# Patient Record
Sex: FEMALE | Race: WHITE | Employment: UNEMPLOYED | URBAN - METROPOLITAN AREA
[De-identification: names, ages, dates, MRNs, and addresses within clinical notes are randomized per-mention and may not be internally consistent; named-entity substitution may affect disease eponyms.]

---

## 2018-03-16 ENCOUNTER — HOSPITAL ENCOUNTER (OUTPATIENT)
Age: 33
Discharge: HOME | End: 2018-03-16
Attending: OBSTETRICS & GYNECOLOGY | Admitting: OBSTETRICS & GYNECOLOGY
Payer: COMMERCIAL

## 2018-03-16 VITALS
OXYGEN SATURATION: 96 % | SYSTOLIC BLOOD PRESSURE: 96 MMHG | DIASTOLIC BLOOD PRESSURE: 58 MMHG | TEMPERATURE: 99 F | HEART RATE: 82 BPM | RESPIRATION RATE: 18 BRPM

## 2018-03-16 LAB
BASOPHILS # BLD: 0 K/UL (ref 0–0.2)
BASOPHILS NFR BLD: 0 % (ref 0–2)
DIFFERENTIAL METHOD BLD: ABNORMAL
EOSINOPHIL # BLD: 0.1 K/UL (ref 0–0.8)
EOSINOPHIL NFR BLD: 2 % (ref 0.5–7.8)
ERYTHROCYTE [DISTWIDTH] IN BLOOD BY AUTOMATED COUNT: 13.1 % (ref 11.9–14.6)
ERYTHROCYTE [DISTWIDTH] IN BLOOD BY AUTOMATED COUNT: 13.2 % (ref 11.9–14.6)
HCT VFR BLD AUTO: 30.8 % (ref 35.8–46.3)
HCT VFR BLD AUTO: 31.3 % (ref 35.8–46.3)
HCT VFR BLD AUTO: 34.1 % (ref 35.8–46.3)
HGB BLD-MCNC: 10.5 G/DL (ref 11.7–15.4)
HGB BLD-MCNC: 10.6 G/DL (ref 11.7–15.4)
HGB BLD-MCNC: 11.5 G/DL (ref 11.7–15.4)
IMM GRANULOCYTES # BLD: 0 K/UL (ref 0–0.5)
IMM GRANULOCYTES NFR BLD AUTO: 0 % (ref 0–5)
LYMPHOCYTES # BLD: 1.9 K/UL (ref 0.5–4.6)
LYMPHOCYTES NFR BLD: 27 % (ref 13–44)
MCH RBC QN AUTO: 28.5 PG (ref 26.1–32.9)
MCH RBC QN AUTO: 28.6 PG (ref 26.1–32.9)
MCHC RBC AUTO-ENTMCNC: 33.7 G/DL (ref 31.4–35)
MCHC RBC AUTO-ENTMCNC: 33.9 G/DL (ref 31.4–35)
MCV RBC AUTO: 84.4 FL (ref 79.6–97.8)
MCV RBC AUTO: 84.6 FL (ref 79.6–97.8)
MONOCYTES # BLD: 0.7 K/UL (ref 0.1–1.3)
MONOCYTES NFR BLD: 9 % (ref 4–12)
NEUTS SEG # BLD: 4.5 K/UL (ref 1.7–8.2)
NEUTS SEG NFR BLD: 62 % (ref 43–78)
PLATELET # BLD AUTO: 172 K/UL (ref 150–450)
PLATELET # BLD AUTO: 200 K/UL (ref 150–450)
PMV BLD AUTO: 8.5 FL (ref 10.8–14.1)
PMV BLD AUTO: 8.7 FL (ref 10.8–14.1)
RBC # BLD AUTO: 3.7 M/UL (ref 4.05–5.25)
RBC # BLD AUTO: 4.04 M/UL (ref 4.05–5.25)
WBC # BLD AUTO: 10 K/UL (ref 4.3–11.1)
WBC # BLD AUTO: 7.3 K/UL (ref 4.3–11.1)

## 2018-03-16 PROCEDURE — 65270000029 HC RM PRIVATE

## 2018-03-16 PROCEDURE — 85025 COMPLETE CBC W/AUTO DIFF WBC: CPT | Performed by: OBSTETRICS & GYNECOLOGY

## 2018-03-16 PROCEDURE — 85027 COMPLETE CBC AUTOMATED: CPT | Performed by: OBSTETRICS & GYNECOLOGY

## 2018-03-16 PROCEDURE — 36415 COLL VENOUS BLD VENIPUNCTURE: CPT | Performed by: OBSTETRICS & GYNECOLOGY

## 2018-03-16 PROCEDURE — 74011258636 HC RX REV CODE- 258/636: Performed by: OBSTETRICS & GYNECOLOGY

## 2018-03-16 PROCEDURE — 85018 HEMOGLOBIN: CPT | Performed by: OBSTETRICS & GYNECOLOGY

## 2018-03-16 RX ORDER — HYDROMORPHONE HCL IN 0.9% NACL 50 MG/50ML
1 PLASTIC BAG, INJECTION (ML) INJECTION
Status: DISCONTINUED | OUTPATIENT
Start: 2018-03-16 | End: 2018-03-16 | Stop reason: HOSPADM

## 2018-03-16 RX ORDER — ONDANSETRON 2 MG/ML
4 INJECTION INTRAMUSCULAR; INTRAVENOUS
Status: DISCONTINUED | OUTPATIENT
Start: 2018-03-16 | End: 2018-03-16 | Stop reason: HOSPADM

## 2018-03-16 RX ORDER — DEXTROSE, SODIUM CHLORIDE, SODIUM LACTATE, POTASSIUM CHLORIDE, AND CALCIUM CHLORIDE 5; .6; .31; .03; .02 G/100ML; G/100ML; G/100ML; G/100ML; G/100ML
150 INJECTION, SOLUTION INTRAVENOUS CONTINUOUS
Status: DISCONTINUED | OUTPATIENT
Start: 2018-03-16 | End: 2018-03-16

## 2018-03-16 RX ORDER — PROGESTERONE 200 MG/1
200 CAPSULE ORAL
COMMUNITY
End: 2018-09-25

## 2018-03-16 RX ORDER — METFORMIN HYDROCHLORIDE 500 MG/1
500 TABLET ORAL
COMMUNITY
End: 2018-09-25

## 2018-03-16 RX ADMIN — SODIUM CHLORIDE, SODIUM LACTATE, POTASSIUM CHLORIDE, CALCIUM CHLORIDE, AND DEXTROSE MONOHYDRATE 150 ML/HR: 600; 310; 30; 20; 5 INJECTION, SOLUTION INTRAVENOUS at 07:05

## 2018-03-16 NOTE — IP AVS SNAPSHOT
[address] 
[phone] Patient: Marly Lofton MRN: [id number] IZL:[id number] A check radha indicates which time of day the medication should be taken. My Medications ASK your doctor about these medications Instructions Each Dose to Equal  
 Morning Noon Evening Bedtime  
 metFORMIN 500 mg tablet Commonly known as:  GLUCOPHAGE Your last dose was: Your next dose is: Take 500 mg by mouth daily (with breakfast). 500 mg  
    
   
   
   
  
 progesterone 200 mg capsule Commonly known as:  PROMETRIUM Your last dose was: Your next dose is: Take 200 mg by mouth nightly. 2 capsules  200 mg

## 2018-03-16 NOTE — PROGRESS NOTES
Shift assessment complete; pt in bed resting. Alert & oriented. Resp even & unlabored on room air; lungs clear. HR regular. Abdomen soft & tender with active bowel sounds. Pt sates she is having 6/10 abdominal pain. No requests for pain meds at this time. Call light in reach; bed low & locked; will continue to monitor.

## 2018-03-16 NOTE — PROGRESS NOTES
Discharge instructions given to pt. Verbalized understanding. IV removed. Top intact. Opportunity for questions provided. Instructed pt to call when she was ready to be taken down.

## 2018-03-16 NOTE — PROGRESS NOTES
Admit assessment complete, see flow sheet notes. Pt resting in bed and is alert and oriented x 4. She denies pain and is on room air. RR even and unlabored. Patient oriented to call light, room, and staff and instructed to call for assistance if needed. Will monitor. Dual skin assessment completed with Renate Lee RN. No skin breakdown noted.

## 2018-03-16 NOTE — PROGRESS NOTES
TRANSFER - IN REPORT:    Verbal report received from Carolina Hearn on Kanika Head  being received from [facility] ER for routine progression of care      Report consisted of patients Situation, Background, Assessment and   Recommendations(SBAR). Information from the following report(s) SBAR, Kardex and ED Summary was reviewed with the receiving nurse. Opportunity for questions and clarification was provided. Assessment completed upon patients arrival to unit and care assumed.

## 2018-03-16 NOTE — IP AVS SNAPSHOT
Summary of Care Report The Summary of Care report has been created to help improve care coordination. Users with access to [employer] or [address] (Web-based application) may access additional patient information including the Discharge Summary. If you are not currently a [address] user and need more information, please call the number listed below in the [place] [address] section and ask to be connected with Medical Records. Facility Information Name Address Phone [address] [place] [address] [address] [place] [id number] [phone] Patient Information Patient Name Sex  Patricia Wolfe ([id number]) Female 1985 Discharge Information Admitting Provider Service Area Unit Juventino Del Angel MD / [id number] [address] Med Surg / [phone] Discharge Provider Discharge Date/Time Discharge Disposition Destination (none) 3/16/2018 (Pending) AHR (none) You are allergic to the following Allergen Reactions Mercury (Elemental) Rash Current Discharge Medication List  
  
ASK your doctor about these medications Dose & Instructions Dispensing Information Comments  
 metFORMIN 500 mg tablet Commonly known as:  GLUCOPHAGE Dose:  500 mg Take 500 mg by mouth daily (with breakfast). Refills:  0  
   
 progesterone 200 mg capsule Commonly known as:  PROMETRIUM Dose:  200 mg Take 200 mg by mouth nightly. 2 capsules Refills:  0 Follow-up Information Follow up With Details Comments Contact Info Provider Unknown   Patient not available to ask Discharge Instructions DISCHARGE SUMMARY from Nurse PATIENT INSTRUCTIONS: 
 
After general anesthesia or intravenous sedation, for 24 hours or while taking prescription Narcotics: · Limit your activities · Do not drive and operate hazardous machinery · Do not make important personal or business decisions · Do  not drink alcoholic beverages · If you have not urinated within 8 hours after discharge, please contact your surgeon on call. Report the following to your surgeon: 
· Excessive pain, swelling, redness or odor of or around the surgical area · Temperature over 100.5 · Nausea and vomiting lasting longer than 4 hours or if unable to take medications · Any signs of decreased circulation or nerve impairment to extremity: change in color, persistent  numbness, tingling, coldness or increase pain · Any questions What to do at Home: 
Recommended activity: Activity as tolerated. If you experience any of the following symptoms nausea, vomiting, chest pain, shortness of breath;  please follow up with MD . 
 
*  Please give a list of your current medications to your Primary Care Provider. *  Please update this list whenever your medications are discontinued, doses are 
    changed, or new medications (including over-the-counter products) are added. *  Please carry medication information at all times in case of emergency situations. These are general instructions for a healthy lifestyle: No smoking/ No tobacco products/ Avoid exposure to second hand smoke Surgeon General's Warning:  Quitting smoking now greatly reduces serious risk to your health. Obesity, smoking, and sedentary lifestyle greatly increases your risk for illness A healthy diet, regular physical exercise & weight monitoring are important for maintaining a healthy lifestyle You may be retaining fluid if you have a history of heart failure or if you experience any of the following symptoms:  Weight gain of 3 pounds or more overnight or 5 pounds in a week, increased swelling in our hands or feet or shortness of breath while lying flat in bed.   Please call your doctor as soon as you notice any of these symptoms; do not wait until your next office visit. Recognize signs and symptoms of STROKE: 
 
F-face looks uneven A-arms unable to move or move unevenly S-speech slurred or non-existent T-time-call 911 as soon as signs and symptoms begin-DO NOT go Back to bed or wait to see if you get better-TIME IS BRAIN. Warning Signs of HEART ATTACK Call 911 if you have these symptoms: 
? Chest discomfort. Most heart attacks involve discomfort in the center of the chest that lasts more than a few minutes, or that goes away and comes back. It can feel like uncomfortable pressure, squeezing, fullness, or pain. ? Discomfort in other areas of the upper body. Symptoms can include pain or discomfort in one or both arms, the back, neck, jaw, or stomach. ? Shortness of breath with or without chest discomfort. ? Other signs may include breaking out in a cold sweat, nausea, or lightheadedness. Don't wait more than five minutes to call [address]! Fast action can save your life. Calling 911 is almost always the fastest way to get lifesaving treatment. Emergency Medical Services staff can begin treatment when they arrive  up to an hour sooner than if someone gets to the hospital by car. The discharge information has been reviewed with the patient. The patient verbalized understanding. Discharge medications reviewed with the patient and appropriate educational materials and side effects teaching were provided. ___________________________________________________________________________________________________________________________________ Chart Review Routing History No Routing History on File

## 2018-03-16 NOTE — DISCHARGE INSTRUCTIONS
DISCHARGE SUMMARY from Nurse    PATIENT INSTRUCTIONS:    After general anesthesia or intravenous sedation, for 24 hours or while taking prescription Narcotics:  · Limit your activities  · Do not drive and operate hazardous machinery  · Do not make important personal or business decisions  · Do  not drink alcoholic beverages  · If you have not urinated within 8 hours after discharge, please contact your surgeon on call. Report the following to your surgeon:  · Excessive pain, swelling, redness or odor of or around the surgical area  · Temperature over 100.5  · Nausea and vomiting lasting longer than 4 hours or if unable to take medications  · Any signs of decreased circulation or nerve impairment to extremity: change in color, persistent  numbness, tingling, coldness or increase pain  · Any questions    What to do at Home:  Recommended activity: Activity as tolerated. If you experience any of the following symptoms nausea, vomiting, chest pain, shortness of breath;  please follow up with MD .    *  Please give a list of your current medications to your Primary Care Provider. *  Please update this list whenever your medications are discontinued, doses are      changed, or new medications (including over-the-counter products) are added. *  Please carry medication information at all times in case of emergency situations. These are general instructions for a healthy lifestyle:    No smoking/ No tobacco products/ Avoid exposure to second hand smoke  Surgeon General's Warning:  Quitting smoking now greatly reduces serious risk to your health.     Obesity, smoking, and sedentary lifestyle greatly increases your risk for illness    A healthy diet, regular physical exercise & weight monitoring are important for maintaining a healthy lifestyle    You may be retaining fluid if you have a history of heart failure or if you experience any of the following symptoms:  Weight gain of 3 pounds or more overnight or 5 pounds in a week, increased swelling in our hands or feet or shortness of breath while lying flat in bed. Please call your doctor as soon as you notice any of these symptoms; do not wait until your next office visit. Recognize signs and symptoms of STROKE:    F-face looks uneven    A-arms unable to move or move unevenly    S-speech slurred or non-existent    T-time-call 911 as soon as signs and symptoms begin-DO NOT go       Back to bed or wait to see if you get better-TIME IS BRAIN. Warning Signs of HEART ATTACK     Call 911 if you have these symptoms:   Chest discomfort. Most heart attacks involve discomfort in the center of the chest that lasts more than a few minutes, or that goes away and comes back. It can feel like uncomfortable pressure, squeezing, fullness, or pain.  Discomfort in other areas of the upper body. Symptoms can include pain or discomfort in one or both arms, the back, neck, jaw, or stomach.  Shortness of breath with or without chest discomfort.  Other signs may include breaking out in a cold sweat, nausea, or lightheadedness. Don't wait more than five minutes to call 911 - MINUTES MATTER! Fast action can save your life. Calling 911 is almost always the fastest way to get lifesaving treatment. Emergency Medical Services staff can begin treatment when they arrive -- up to an hour sooner than if someone gets to the hospital by car. The discharge information has been reviewed with the patient. The patient verbalized understanding. Discharge medications reviewed with the patient and appropriate educational materials and side effects teaching were provided.   ___________________________________________________________________________________________________________________________________

## 2018-03-16 NOTE — PROGRESS NOTES
Dr. Nichelle Teran paged about patient arriving to floor. Orders received and MD to come see patient.

## 2018-03-16 NOTE — IP AVS SNAPSHOT
Des Garcia 
 
 
 [address] [id number] [facility] 
[phone] Patient: Latasha Stokes MRN: [id number] FNA:[id number] About your hospitalization You were admitted on:  March 16, 2018 You last received care in the:  [address] [place] [address] You were discharged on:  March 16, 2018 Why you were hospitalized Your primary diagnosis was:  Not on File Follow-up Information Follow up With Details Comments Contact Info Provider Unknown   Patient not available to ask Discharge Orders None A check radha indicates which time of day the medication should be taken. My Medications ASK your doctor about these medications Instructions Each Dose to Equal  
 Morning Noon Evening Bedtime  
 metFORMIN 500 mg tablet Commonly known as:  GLUCOPHAGE Your last dose was: Your next dose is: Take 500 mg by mouth daily (with breakfast). 500 mg  
    
   
   
   
  
 progesterone 200 mg capsule Commonly known as:  PROMETRIUM Your last dose was: Your next dose is: Take 200 mg by mouth nightly. 2 capsules 200 mg Discharge Instructions DISCHARGE SUMMARY from Nurse PATIENT INSTRUCTIONS: 
 
After general anesthesia or intravenous sedation, for 24 hours or while taking prescription Narcotics: · Limit your activities · Do not drive and operate hazardous machinery · Do not make important personal or business decisions · Do  not drink alcoholic beverages · If you have not urinated within 8 hours after discharge, please contact your surgeon on call. Report the following to your surgeon: 
· Excessive pain, swelling, redness or odor of or around the surgical area · Temperature over 100.5 · Nausea and vomiting lasting longer than 4 hours or if unable to take medications · Any signs of decreased circulation or nerve impairment to extremity: change in color, persistent  numbness, tingling, coldness or increase pain · Any questions What to do at Home: 
Recommended activity: Activity as tolerated. If you experience any of the following symptoms nausea, vomiting, chest pain, shortness of breath;  please follow up with MD . 
 
*  Please give a list of your current medications to your Primary Care Provider. *  Please update this list whenever your medications are discontinued, doses are 
    changed, or new medications (including over-the-counter products) are added. *  Please carry medication information at all times in case of emergency situations. These are general instructions for a healthy lifestyle: No smoking/ No tobacco products/ Avoid exposure to second hand smoke Surgeon General's Warning:  Quitting smoking now greatly reduces serious risk to your health. Obesity, smoking, and sedentary lifestyle greatly increases your risk for illness A healthy diet, regular physical exercise & weight monitoring are important for maintaining a healthy lifestyle You may be retaining fluid if you have a history of heart failure or if you experience any of the following symptoms:  Weight gain of 3 pounds or more overnight or 5 pounds in a week, increased swelling in our hands or feet or shortness of breath while lying flat in bed. Please call your doctor as soon as you notice any of these symptoms; do not wait until your next office visit. Recognize signs and symptoms of STROKE: 
 
F-face looks uneven A-arms unable to move or move unevenly S-speech slurred or non-existent T-time-call 911 as soon as signs and symptoms begin-DO NOT go Back to bed or wait to see if you get better-TIME IS BRAIN. Warning Signs of HEART ATTACK Call 911 if you have these symptoms: 
? Chest discomfort.  Most heart attacks involve discomfort in the center of the chest that lasts more than a few minutes, or that goes away and comes back. It can feel like uncomfortable pressure, squeezing, fullness, or pain. ? Discomfort in other areas of the upper body. Symptoms can include pain or discomfort in one or both arms, the back, neck, jaw, or stomach. ? Shortness of breath with or without chest discomfort. ? Other signs may include breaking out in a cold sweat, nausea, or lightheadedness. Don't wait more than five minutes to call [address]! Fast action can save your life. Calling 911 is almost always the fastest way to get lifesaving treatment. Emergency Medical Services staff can begin treatment when they arrive  up to an hour sooner than if someone gets to the hospital by car. The discharge information has been reviewed with the patient. The patient verbalized understanding. Discharge medications reviewed with the patient and appropriate educational materials and side effects teaching were provided. ___________________________________________________________________________________________________________________________________ Introducing [facility] & HEALTH SERVICES! [facility] introduces WebVisible patient portal. Now you can access parts of your medical record, email your doctor's office, and request medication refills online. 1. In your internet browser, go to [email or web address] 2. Click on the First Time User? Click Here link in the Sign In box. You will see the New Member Sign Up page. 3. Enter your WebVisible Access Code exactly as it appears below. You will not need to use this code after youve completed the sign-up process. If you do not sign up before the expiration date, you must request a new code. · WebVisible Access Code: [email or web address] Expires: 6/14/2018  6:05 AM 
 
4. Enter the last four digits of your Social Security Number (xxxx) and Date of Birth (mm/dd/yyyy) as indicated and click Submit. You will be taken to the next sign-up page. 5. Create a eRALOS3 ID. This will be your eRALOS3 login ID and cannot be changed, so think of one that is secure and easy to remember. 6. Create a eRALOS3 password. You can change your password at any time. 7. Enter your Password Reset Question and Answer. This can be used at a later time if you forget your password. 8. Enter your e-mail address. You will receive e-mail notification when new information is available in [address] 9. Click Sign Up. You can now view and download portions of your medical record. 10. Click the Download Summary menu link to download a portable copy of your medical information. If you have questions, please visit the Frequently Asked Questions section of the eRALOS3 website. Remember, eRALOS3 is NOT to be used for urgent needs. For medical emergencies, dial 911. Now available from your iPhone and Android! Providers Seen During Your Hospitalization Provider Specialty Primary office phone Linda Camarena MD Obstetrics & Gynecology [phone] Your Primary Care Physician (PCP) Primary Care Physician Office Phone Office Fax UNKNOWN, PROVIDER ** None ** ** None ** You are allergic to the following Allergen Reactions Mercury (Elemental) Rash Patient Belongings The following personal items are in your possession at time of discharge: 
  Dental Appliances: None         Home Medications: None   Jewelry: Ring, Necklace, With patient  Clothing: None    Other Valuables: None Discharge Instructions Attachments/References OVARIAN CYST: RUPTURED (ENGLISH) HAND-WASHING (ENGLISH) SECONDHAND SMOKE (ENGLISH) Patient Handouts Ruptured Ovarian Cyst: Care Instructions Your Care Instructions Sometimes a sac forms on the surface of a woman's ovary.  When the sac swells up with fluid, it forms a cyst. If the cyst breaks open, it is called a ruptured ovarian cyst. Sometimes a cyst may rupture and then form again. Sometimes a cyst may partly break open. Blood and fluid can spill out into the lower belly and pelvis. You may not have symptoms from the cyst. But if it is large, or if it twists or bleeds, you may have pain or other problems. You may feel pain because the fluid irritates the pelvis. Your doctor may use a pelvic ultrasound to see if you have a cyst. Your doctor may also do blood tests. Treatment depends on your symptoms. If they are mild, your doctor may suggest carefully watching your symptoms. But if you have a cyst that is very large, bleeds a lot, or causes other problems, your doctor may suggest surgery to remove it. Follow-up care is a key part of your treatment and safety. Be sure to make and go to all appointments, and call your doctor if you are having problems. It's also a good idea to know your test results and keep a list of the medicines you take. How can you care for yourself at home? · Use heat, such as a warm water bottle, a heating pad set on low, or a warm bath, to relax tense muscles and relieve cramping. · Be safe with medicines. Read and follow all instructions on the label. ¨ If the doctor gave you a prescription medicine for pain, take it as prescribed. ¨ If you are not taking a prescription pain medicine, ask your doctor if you can take an over-the-counter medicine. When should you call for help? Call 911 anytime you think you may need emergency care. For example, call if: 
? · You passed out (lost consciousness). ?Call your doctor now or seek immediate medical care if: 
? · You have severe vaginal bleeding. ? · You are dizzy or lightheaded, or you feel like you may faint. ? · You have new or worse pain in your belly or pelvis. ? Watch closely for changes in your health, and be sure to contact your doctor if: 
? · You think you may be pregnant. ? · You do not get better as expected. Where can you learn more? Go to http://trey-radha.info/. Enter B223 in the search box to learn more about \"Ruptured Ovarian Cyst: Care Instructions. \" Current as of: October 13, 2016 Content Version: 11.4 © 4781-9660 [employer]. Care instructions adapted under license by [employer] (which disclaims liability or warranty for this information). If you have questions about a medical condition or this instruction, always ask your healthcare professional. [address] any warranty or liability for your use of this information. Hand-Washing: Care Instructions Your Care Instructions It is important for caregivers to wash their hands properly. This is the single best way to prevent the spread of infections. Hand-washing can help keep you from getting sick. It is easy, doesn't cost much, and it works. Make sure that you and your caregivers follow safe hand-washing routines. Caregivers may include health care workers or family members at home or in a care facility. You can talk to them about this information on hand-washing. Follow-up care is a key part of your treatment and safety. Be sure to make and go to all appointments, and call your doctor if you are having problems. It's also a good idea to know your test results and keep a list of the medicines you take. How can you care for yourself at home? · Caregivers should wash their hands with soap and water: ¨ When their hands are dirty, especially after being exposed to body fluids. This includes blood. ¨ When their hands may have been exposed to germs that could spread infection. ¨ After they touch broken skin, sores, or wound bandages. ¨ After they use the bathroom. · At other times, caregivers can use an alcohol-based gel  or soap and water to clean hands. This should be done: ¨ Before and after any contact with you. ¨ After they take off gloves. ¨ Before they handle a device that touches your body (even if gloves are used). ¨ After they touch any objects near you, such as medical equipment, lights, or doorknobs. ¨ Before they handle medicine or prepare food. Proper hand-washing for caregivers · When using an alcohol-based gel , fill your palm with the gel. Then spread it all over your hands. Rub your hands together until they are dry. · When washing hands with soap and water: ¨ Wet your hands with running water, and apply soap. ¨ Rub your hands together to make a lather. Scrub well for at least 20 seconds. ¨ Pay special attention to your wrists, the backs of your hands, between your fingers, and under your fingernails. ¨ Rinse your hands well under running water. ¨ Use a clean towel to dry your hands, or air-dry your hands. You may want to use a clean towel as a barrier between the faucet and your clean hands when you turn off the water. · If you use bar soap, use small bars. Set the soap on a rack that lets water drain. Where can you learn more? Go to http://trey-radha.info/. Enter X083 in the search box to learn more about \"Hand-Washing: Care Instructions. \" Current as of: March 3, 2017 Content Version: 11.4 © 0797-0573 [employer]. Care instructions adapted under license by [employer] (which disclaims liability or warranty for this information). If you have questions about a medical condition or this instruction, always ask your healthcare professional. [address] [address] any warranty or liability for your use of this information. Secondhand Smoke: Care Instructions Your Care Instructions Secondhand smoke comes from the burning end of a cigarette, cigar, or pipe and the smoke that a smoker exhales. The smoke contains nicotine and many other harmful chemicals.  Breathing secondhand smoke can cause or worsen health problems including cancer, asthma, coronary artery disease, and respiratory infections. It can make your eyes and nose burn and cause a sore throat. Secondhand smoke is especially bad for babies and young children whose lungs are still developing. Babies whose parents smoke are more likely to have ear infections, pneumonia, and bronchitis in the first few years of their lives. Secondhand smoke can make asthma symptoms worse in children. If you are pregnant, it is important that you not smoke and that you avoid secondhand smoke. You are more likely to give birth to a baby who weighs less than expected (low birth weight) if you smoke. And your baby may have a greater risk for sudden infant death syndrome (SIDS). Babies whose mothers are exposed to secondhand smoke during pregnancy have a higher risk for health problems. Follow-up care is a key part of your treatment and safety. Be sure to make and go to all appointments, and call your doctor if you are having problems. It's also a good idea to know your test results and keep a list of the medicines you take. How can you care for yourself at home? · Do not smoke or let anyone else smoke in your home. If people must smoke, ask them to go outside. · If people do smoke in your home, choose a room where you can open a window or use a fan to get the smoke outside. · Do not let anyone smoke in your car. If someone must smoke, pull over in a safe place and let him or her smoke away from the car. · Ask your employer to make sure that you have a smoke-free work area. · Make sure that your children are not exposed to secondhand smoke at day care, school, and after-school programs. · Try to choose nonsmoking bars, restaurants, and other public places when you go out. · Help your family and friends who smoke to quit by encouraging them to try. Tell them about treatment resources. Having support from others often helps. · If you smoke, quit. Quitting is hard, but there are ways to boost your chance of quitting tobacco for good. ¨ Use nicotine gum, patches, or lozenges. Call a quitline. Ask your doctor about stop-smoking programs and medicines. ¨ Keep trying. When should you call for help? Watch closely for changes in your health, and be sure to contact your doctor if you have any problems. Where can you learn more? Go to http://trey-radha.info/. Enter L004 in the search box to learn more about \"Secondhand Smoke: Care Instructions. \" Current as of: March 20, 2017 Content Version: 11.4 © 8079-9965 [employer]. Care instructions adapted under license by [employer] (which disclaims liability or warranty for this information). If you have questions about a medical condition or this instruction, always ask your healthcare professional. [address] any warranty or liability for your use of this information. Please provide this summary of care documentation to your next provider. Signatures-by signing, you are acknowledging that this After Visit Summary has been reviewed with you and you have received a copy. Patient Signature:  ____________________________________________________________ Date:  ____________________________________________________________  
  
Theresa Le Provider Signature:  ____________________________________________________________ Date:  ____________________________________________________________

## 2018-03-16 NOTE — PROGRESS NOTES
Pt states she is cycle day 28, took 100 mg clomid CD 3-7. Then [facility] surge was not positive. Had intercourse yesterday and presented to ER in pain. Suspect ruptured cyst. CBC obtained in ER and pt admitted for observation due to possible internal bleeding. Exam now better, pt feeing less pain, still NPO in case she needs surgery. Will check another Hgb to see if stable.

## 2018-09-19 ENCOUNTER — HOSPITAL ENCOUNTER (EMERGENCY)
Age: 33
Discharge: HOME | End: 2018-09-19
Attending: EMERGENCY MEDICINE
Payer: COMMERCIAL

## 2018-09-19 PROCEDURE — 75810000275 HC EMERGENCY DEPT VISIT NO LEVEL OF CARE: Performed by: EMERGENCY MEDICINE

## 2018-09-25 PROBLEM — F32.A DEPRESSION: Status: ACTIVE | Noted: 2018-09-25

## 2018-09-25 PROBLEM — Z34.90 PREGNANCY: Status: ACTIVE | Noted: 2018-09-25

## 2018-09-25 PROBLEM — E28.2 PCOS (POLYCYSTIC OVARIAN SYNDROME): Status: ACTIVE | Noted: 2018-09-25

## 2018-10-01 PROBLEM — Z28.39 RUBELLA NON-IMMUNE STATUS, ANTEPARTUM: Status: ACTIVE | Noted: 2018-10-01

## 2018-10-01 PROBLEM — O09.899 RUBELLA NON-IMMUNE STATUS, ANTEPARTUM: Status: ACTIVE | Noted: 2018-10-01

## 2018-10-25 PROBLEM — F32.A DEPRESSION: Status: RESOLVED | Noted: 2018-09-25 | Resolved: 2018-10-25

## 2018-10-26 PROBLEM — O09.519 HIGH-RISK PREGNANCY, PRIMIGRAVIDA OF ADVANCED MATERNAL AGE: Status: ACTIVE | Noted: 2018-10-26

## 2018-10-26 PROBLEM — O34.80 POLYCYSTIC OVARY AFFECTING PREGNANCY, ANTEPARTUM: Status: ACTIVE | Noted: 2018-09-25

## 2018-10-26 PROBLEM — Z36.82 NUCHAL TRANSLUCENCY OF FETUS ON PRENATAL ULTRASOUND: Status: ACTIVE | Noted: 2018-10-26

## 2018-12-21 PROBLEM — Z36.82 NUCHAL TRANSLUCENCY OF FETUS ON PRENATAL ULTRASOUND: Status: RESOLVED | Noted: 2018-10-26 | Resolved: 2018-12-21

## 2020-02-14 ENCOUNTER — HOSPITAL ENCOUNTER (EMERGENCY)
Age: 35
Discharge: HOME | End: 2020-02-14
Attending: EMERGENCY MEDICINE
Payer: COMMERCIAL

## 2020-02-14 VITALS
OXYGEN SATURATION: 99 % | SYSTOLIC BLOOD PRESSURE: 120 MMHG | DIASTOLIC BLOOD PRESSURE: 74 MMHG | HEIGHT: 68 IN | RESPIRATION RATE: 15 BRPM | BODY MASS INDEX: 21.98 KG/M2 | WEIGHT: 145 LBS | TEMPERATURE: 97.5 F | HEART RATE: 78 BPM

## 2020-02-14 DIAGNOSIS — R20.2 PARESTHESIA: ICD-10-CM

## 2020-02-14 DIAGNOSIS — G44.201 ACUTE INTRACTABLE TENSION-TYPE HEADACHE: ICD-10-CM

## 2020-02-14 DIAGNOSIS — E86.0 DEHYDRATION: Primary | ICD-10-CM

## 2020-02-14 LAB
ANION GAP SERPL CALC-SCNC: 11 MMOL/L (ref 7–16)
BACTERIA URNS QL MICRO: 0 /HPF
BASOPHILS # BLD: 0.1 K/UL (ref 0–0.2)
BASOPHILS NFR BLD: 1 % (ref 0–2)
BUN SERPL-MCNC: 11 MG/DL (ref 6–23)
CALCIUM SERPL-MCNC: 9.3 MG/DL (ref 8.3–10.4)
CASTS URNS QL MICRO: NORMAL /LPF
CHLORIDE SERPL-SCNC: 105 MMOL/L (ref 98–107)
CK SERPL-CCNC: 127 U/L (ref 21–215)
CO2 SERPL-SCNC: 22 MMOL/L (ref 21–32)
CREAT SERPL-MCNC: 0.89 MG/DL (ref 0.6–1)
DIFFERENTIAL METHOD BLD: ABNORMAL
EOSINOPHIL # BLD: 0 K/UL (ref 0–0.8)
EOSINOPHIL NFR BLD: 0 % (ref 0.5–7.8)
EPI CELLS #/AREA URNS HPF: NORMAL /HPF
ERYTHROCYTE [DISTWIDTH] IN BLOOD BY AUTOMATED COUNT: 12.5 % (ref 11.9–14.6)
GLUCOSE SERPL-MCNC: 106 MG/DL (ref 65–100)
HCG UR QL: NEGATIVE
HCT VFR BLD AUTO: 43.2 % (ref 35.8–46.3)
HGB BLD-MCNC: 14.4 G/DL (ref 11.7–15.4)
IMM GRANULOCYTES # BLD AUTO: 0 K/UL (ref 0–0.5)
IMM GRANULOCYTES NFR BLD AUTO: 0 % (ref 0–5)
LYMPHOCYTES # BLD: 1.1 K/UL (ref 0.5–4.6)
LYMPHOCYTES NFR BLD: 14 % (ref 13–44)
MCH RBC QN AUTO: 28 PG (ref 26.1–32.9)
MCHC RBC AUTO-ENTMCNC: 33.3 G/DL (ref 31.4–35)
MCV RBC AUTO: 84 FL (ref 79.6–97.8)
MONOCYTES # BLD: 0.3 K/UL (ref 0.1–1.3)
MONOCYTES NFR BLD: 4 % (ref 4–12)
NEUTS SEG # BLD: 6.3 K/UL (ref 1.7–8.2)
NEUTS SEG NFR BLD: 81 % (ref 43–78)
NRBC # BLD: 0 K/UL (ref 0–0.2)
PLATELET # BLD AUTO: 240 K/UL (ref 150–450)
PMV BLD AUTO: 8.4 FL (ref 9.4–12.3)
POTASSIUM SERPL-SCNC: 3.4 MMOL/L (ref 3.5–5.1)
RBC # BLD AUTO: 5.14 M/UL (ref 4.05–5.2)
RBC #/AREA URNS HPF: NORMAL /HPF
SODIUM SERPL-SCNC: 138 MMOL/L (ref 136–145)
WBC # BLD AUTO: 7.8 K/UL (ref 4.3–11.1)
WBC URNS QL MICRO: NORMAL /HPF

## 2020-02-14 PROCEDURE — 74011250636 HC RX REV CODE- 250/636: Performed by: EMERGENCY MEDICINE

## 2020-02-14 PROCEDURE — 96374 THER/PROPH/DIAG INJ IV PUSH: CPT

## 2020-02-14 PROCEDURE — 96375 TX/PRO/DX INJ NEW DRUG ADDON: CPT

## 2020-02-14 PROCEDURE — 85025 COMPLETE CBC W/AUTO DIFF WBC: CPT

## 2020-02-14 PROCEDURE — 99285 EMERGENCY DEPT VISIT HI MDM: CPT

## 2020-02-14 PROCEDURE — 81025 URINE PREGNANCY TEST: CPT

## 2020-02-14 PROCEDURE — 80048 BASIC METABOLIC PNL TOTAL CA: CPT

## 2020-02-14 PROCEDURE — 81003 URINALYSIS AUTO W/O SCOPE: CPT

## 2020-02-14 PROCEDURE — 82550 ASSAY OF CK (CPK): CPT

## 2020-02-14 PROCEDURE — 96361 HYDRATE IV INFUSION ADD-ON: CPT

## 2020-02-14 PROCEDURE — 81015 MICROSCOPIC EXAM OF URINE: CPT

## 2020-02-14 PROCEDURE — 74011250637 HC RX REV CODE- 250/637: Performed by: EMERGENCY MEDICINE

## 2020-02-14 RX ORDER — KETOROLAC TROMETHAMINE 30 MG/ML
15 INJECTION, SOLUTION INTRAMUSCULAR; INTRAVENOUS
Status: COMPLETED | OUTPATIENT
Start: 2020-02-14 | End: 2020-02-14

## 2020-02-14 RX ORDER — MELOXICAM 7.5 MG/1
7.5 TABLET ORAL
Qty: 30 TAB | Refills: 0 | Status: SHIPPED | OUTPATIENT
Start: 2020-02-14 | End: 2020-03-15

## 2020-02-14 RX ORDER — POTASSIUM CHLORIDE 20 MEQ/1
20 TABLET, EXTENDED RELEASE ORAL
Status: COMPLETED | OUTPATIENT
Start: 2020-02-14 | End: 2020-02-14

## 2020-02-14 RX ORDER — ONDANSETRON 2 MG/ML
4 INJECTION INTRAMUSCULAR; INTRAVENOUS
Status: COMPLETED | OUTPATIENT
Start: 2020-02-14 | End: 2020-02-14

## 2020-02-14 RX ORDER — CYCLOBENZAPRINE HCL 10 MG
10 TABLET ORAL
Qty: 10 TAB | Refills: 0 | Status: SHIPPED | OUTPATIENT
Start: 2020-02-14 | End: 2020-02-24

## 2020-02-14 RX ORDER — DIAZEPAM 2 MG/1
2 TABLET ORAL
Status: COMPLETED | OUTPATIENT
Start: 2020-02-14 | End: 2020-02-14

## 2020-02-14 RX ADMIN — DIAZEPAM 2 MG: 2 TABLET ORAL at 02:53

## 2020-02-14 RX ADMIN — ONDANSETRON 4 MG: 2 INJECTION INTRAMUSCULAR; INTRAVENOUS at 01:37

## 2020-02-14 RX ADMIN — KETOROLAC TROMETHAMINE 15 MG: 30 INJECTION, SOLUTION INTRAMUSCULAR at 02:54

## 2020-02-14 RX ADMIN — POTASSIUM CHLORIDE 20 MEQ: 1500 TABLET, EXTENDED RELEASE ORAL at 02:53

## 2020-02-14 RX ADMIN — SODIUM CHLORIDE 1000 ML: 900 INJECTION, SOLUTION INTRAVENOUS at 01:37

## 2020-02-14 NOTE — DISCHARGE INSTRUCTIONS
Make sure you are drinking 120 ounces of water or Gatorade daily. Take the Flexeril and meloxicam as needed. Take the meloxicam once daily in the morning with food for the next week then as needed. Do the stretching exercises like we discussed. Drink 1 to 2 L of water daily. Take the Flexeril at nighttime before going to bed as needed.

## 2020-02-14 NOTE — ED NOTES
I have reviewed discharge instructions with the patient. The patient verbalized understanding. Patient left ED via Discharge Method: ambulatory to Home with (insert name of family/friend, self, transport ). Opportunity for questions and clarification provided. Patient given 2 scripts. To continue your aftercare when you leave the hospital, you may receive an automated call from our care team to check in on how you are doing. This is a free service and part of our promise to provide the best care and service to meet your aftercare needs.  If you have questions, or wish to unsubscribe from this service please call [phone]. Thank you for Choosing our [facility] Emergency Department.

## 2020-02-14 NOTE — ED PROVIDER NOTES
Patient is a 65-year-old female presenting to the emergency department today complaining of a headache which she describes as a bandlike headache across the front. The patient states that she played a lot of Dipity this afternoon got dehydrated noticed her urine was. The patient states that she has been trying to drink fluids since then but has not had any real improvement with her headache. She did have some blurred vision and has had some bilateral tingling sensation in the hands and feet. The patient states that she has not had any slurred speech or focal neurologic deficits with weakness unilaterally. The patient says that the blurred vision and her seizures have moved. She notes that palpation of the neck makes things worse.            Past Medical History:   Diagnosis Date    Depression     No medication    Infertility, female     Miscarriage 04/2018    PCOS (polycystic ovarian syndrome)        Past Surgical History:   Procedure Laterality Date    HX OTHER SURGICAL      tooth implant    HX WISDOM TEETH EXTRACTION           Family History:   Problem Relation Age of Onset    Other Father         Con Syndome     Diabetes Paternal Grandfather        Social History     Socioeconomic History    Marital status:      Spouse name: Not on file    Number of children: Not on file    Years of education: Not on file    Highest education level: Not on file   Occupational History    Not on file   Social Needs    Financial resource strain: Not on file    Food insecurity:     Worry: Not on file     Inability: Not on file    Transportation needs:     Medical: Not on file     Non-medical: Not on file   Tobacco Use    Smoking status: Never Smoker    Smokeless tobacco: Never Used   Substance and Sexual Activity    Alcohol use: No     Comment: occasional prior to +UPT    Drug use: No    Sexual activity: Yes     Partners: Male     Birth control/protection: None   Lifestyle    Physical activity: Days per week: Not on file     Minutes per session: Not on file    Stress: Not on file   Relationships    Social connections:     Talks on phone: Not on file     Gets together: Not on file     Attends Temple service: Not on file     Active member of club or organization: Not on file     Attends meetings of clubs or organizations: Not on file     Relationship status: Not on file    Intimate partner violence:     Fear of current or ex partner: Not on file     Emotionally abused: Not on file     Physically abused: Not on file     Forced sexual activity: Not on file   Other Topics Concern    Not on file   Social History Narrative    Not on file         ALLERGIES: Gluten and Mercury (elemental)    Review of Systems   Eyes: Positive for visual disturbance. Neurological: Positive for numbness and headaches. All other systems reviewed and are negative. Vitals:    02/14/20 0059   BP: 121/75   Pulse: 74   Resp: 16   Temp: 97.5 °F (36.4 °C)   SpO2: 99%   Weight: 65.8 kg (145 lb)   Height: 5' 8\" (1.727 m)            Physical Exam     GENERAL:The patient is well nourished, and well-hydrated. VITAL SIGNS: Heart rate, blood pressure, respiratory rate reviewed as recorded in  nurse's notes  EYES: Pupils reactive. Extraocular motion intact. No conjunctival redness or drainage. EARS: No external masses or lesions. NOSE: No nasal drainage or epistaxis. MOUTH/THROAT: Pharynx clear; airway patent. NECK: Supple, no meningeal signs. Trachea midline. No masses or thyromegaly. LUNGS: Breath sounds clear and equal bilaterally no accessory muscle use  CHEST: No deformity  CARDIOVASCULAR: Regular rate and rhythm  ABDOMEN: Soft without tenderness. No palpable masses or organomegaly. No  peritoneal signs. No rigidity. EXTREMITIES: No clubbing or cyanosis. No joint swelling. Normal muscle tone. No  restricted range of motion appreciated. NEUROLOGIC: Sensation is grossly intact.  Cranial nerve exam reveals face is  symmetrical, tongue is midline speech is clear. Negative Kernig's and Brudzinski sign  SKIN: No rash or petechiae. Good skin turgor palpated. PSYCHIATRIC: Alert and oriented. Appropriate behavior and judgment. MDM  Number of Diagnoses or Management Options  Acute intractable tension-type headache:   Dehydration:   Paresthesia:   Diagnosis management comments: Tension headache, dehydration, electrolyte abnormality, renal failure       Amount and/or Complexity of Data Reviewed  Clinical lab tests: reviewed and ordered  Tests in the medicine section of CPT®: ordered and reviewed      ED Course as of Feb 14 0322 Fri Feb 14, 2020 0317 Give the patient medications for her symptoms here in the emergency department. I talked to her about need to stay well-hydrated and lay off exercise for the next 2 to 3 days.     [KH]      ED Course User Index  [KH] Cecelia Coker,        Procedures

## 2020-03-19 ENCOUNTER — APPOINTMENT (OUTPATIENT)
Dept: PHYSICAL THERAPY | Age: 35
End: 2020-03-19

## 2020-03-24 ENCOUNTER — APPOINTMENT (OUTPATIENT)
Dept: PHYSICAL THERAPY | Age: 35
End: 2020-03-24

## 2020-03-26 ENCOUNTER — APPOINTMENT (OUTPATIENT)
Dept: PHYSICAL THERAPY | Age: 35
End: 2020-03-26

## 2020-03-31 ENCOUNTER — APPOINTMENT (OUTPATIENT)
Dept: PHYSICAL THERAPY | Age: 35
End: 2020-03-31

## 2020-04-06 ENCOUNTER — APPOINTMENT (OUTPATIENT)
Dept: PHYSICAL THERAPY | Age: 35
End: 2020-04-06

## 2020-05-11 ENCOUNTER — HOSPITAL ENCOUNTER (OUTPATIENT)
Dept: PHYSICAL THERAPY | Age: 35
Discharge: HOME | End: 2020-05-11
Payer: COMMERCIAL

## 2020-05-11 PROCEDURE — 97161 PT EVAL LOW COMPLEX 20 MIN: CPT

## 2020-05-11 NOTE — THERAPY EVALUATION
Jesu Gamino  : 1985  Primary: [facility] [employer] Other  Secondary:  [facility] [address], [address] [facility]  [phone]   [phone]       OUTPATIENT PHYSICAL THERAPY:Initial Assessment 2020   ICD-10: Treatment Diagnosis: Cervicalgia (M54.2), Headache (R51)  Precautions/Allergies:   Gluten and Mercury (elemental)   TREATMENT PLAN:  Effective Dates: 2020 TO 7/10/2020 (60 days). Frequency/Duration: 2 times a week for 60 Day(s) MEDICAL/REFERRING DIAGNOSIS:  Cervicalgia [M54.2]  Other chronic pain [G89.29]   DATE OF ONSET: mid-February following playing citibuddies  REFERRING PHYSICIAN: JESSICA Lee MD Orders: evaluate and treat   Return MD Appointment: as needed      INITIAL ASSESSMENT:  Ms. Yolanda Leonard is a 38year old female with neck pain, headache, and UE/LE tingling that began following playing citibuddies in mid-February. She reports no injury of mechanism and that tingling has gone from bilateral to right side only and is intermittent and decreasing in frequency. She reports that neck pain and headache are still daily and very limiting to her activity level. She reports difficulty with any increased activity level such as racquetball, jogging, or exercising; holding her son, especially overhead; gardening; and looking down to do daily tasks such as cooking. She demonstrates muscular hypertonicity and tenderness upon palpation that are consistent with headache and neck pain symptoms and increased neural tension with ULTT-A that is consistent with her UE symptoms. She does not demonstrate any other s/s consistent with upper cervical instability/upper motor neuron symptoms or radiculopathy. She will benefit from skilled PT to address above listed impairments and functional limitations to facilitate return to prior level of function. PROBLEM LIST (Impacting functional limitations):  1.  Decreased ADL/Functional Activities  2. Increased Pain  3. Decreased Activity Tolerance  4. Decreased Flexibility/Joint Mobility INTERVENTIONS PLANNED: (Treatment may consist of any combination of the following)  1. Cryotherapy  2. Heat  3. Home Exercise Program (HEP)  4. Manual Therapy  5. Neuromuscular Re-education/Strengthening  6. Range of Motion (ROM)  7. Therapeutic Activites  8. Therapeutic Exercise/Strengthening     GOALS: (Goals have been discussed and agreed upon with patient.)  Short-Term Functional Goals: Time Frame: by 6/8/20  1. Pt will report headache frequency of 3 a week or less. 2. Pt will report minimal to no pain with looking down to perform normal daily tasks (cooking, etc). Discharge Goals: Time Frame: by 7/10/20   1. Pt will report no limitation in being active 5-7 days per week for 1-2 hours a day due to pain. 2. Pt will report no limitation in gardening or doing housework for 1 hour at a time due to pain. 3. Pt will report headache frequency of 0-1 per week. 4. Pt will demonstrate improvement in function with NDI to 12% or less disability. OUTCOME MEASURE:   Tool Used: Neck Disability Index (NDI)  Score:  Initial: 29/50 (58% disability)   Most Recent: X/50 (Date: -- )   Interpretation of Score: The Neck Disability Index is a revised form of the Oswestry Low Back Pain Index and is designed to measure the activities of daily living in person's with neck pain. Each section is scored on a 0-5 scale, 5 representing the greatest disability. The scores of each section are added together for a total score of 50. MEDICAL NECESSITY:   · Patient is expected to demonstrate progress in soft tissue mobility to increase independence with high level activities such as racquetball and gardening. .  REASON FOR SERVICES/OTHER COMMENTS:  · Patient continues to require modification of therapeutic interventions to increase complexity of exercises.   Total Duration:  PT Patient Time In/Time Out  Time In: 1030  Time Out: 1130    Rehabilitation Potential For Stated Goals: Good  Regarding Shanna Gerber's therapy, I certify that the treatment plan above will be carried out by a therapist or under their direction. Thank you for this referral,  Mirtha Ribera, PT     Referring Physician Signature: JESSICA Castano _______________________________ Date _____________     PAIN/SUBJECTIVE:   Initial: Pain Intensity 1: 0  Post Session:  0/10   HISTORY:   History of Injury/Illness (Reason for Referral):  Pt reports  neck pain, headache, and UE/LE tingling that began following playing rac[employer]pe in mid-February. She reports that initial symptom was headache that progress to numbness and that she went to the ER where she had an x-ray and then went home. She reports that she then saw a chiropractor who ordered an MRI and that she saw 1-2 times a week and has had some temporary pain relief. She reports no injury of mechanism and that tingling has gone from bilateral to right side only and is intermittent and decreasing in frequency. She reports that neck pain and headache are still daily and very limiting to her activity level. She reports no pain at present without movement, but pain that increases especially with looking down. She reports headaches come and go during the day depending on activity level, but that she has a headache every day. Past Medical History/Comorbidities:   Ms. Ricki Jacobs  has a past medical history of Depression, Infertility, female, Miscarriage (04/2018), and PCOS (polycystic ovarian syndrome). She also has no past medical history of Abnormal Papanicolaou smear of cervix, Asthma, DVT (deep venous thrombosis) ([facility] [address] Epilepsy ([facility] [address] Hypertension, Hyperthyroidism, Hypothyroidism, or Type I diabetes mellitus ([facility] [address]   Ms. Ricki Jacobs  has a past surgical history that includes hx wisdom teeth extraction and hx other surgical.  Social History/Living Environment:     Pt lives in a private home with her  and 3year old son. Prior Level of Function/Work/Activity:  Pt is a stay at home mom. Prior to injury she could care for her son and do housework without pain or limitation and worked out and participated in sports 1-2 hours, 7 days per week. Ambulatory/Rehab Services H2 Model Falls Risk Assessment   Risk Factors:       No Risk Factors Identified Ability to Rise from Chair:       (0)  Ability to rise in a single movement   Falls Prevention Plan:       No modifications necessary   Total: (5 or greater = High Risk): 0   ©2010 [facility] [employer]. All Rights Reserved. [facility] Patent #[id number]. Federal Law prohibits the replication, distribution or use without written permission from [employer]   Current Medications:     No current outpatient medications on file. Date Last Reviewed:  5/12/2020     Number of Personal Factors/Comorbidities that affect the Plan of Care: 0: LOW COMPLEXITY   EXAMINATION:   Observation/Orthostatic Postural Assessment:          Mild forward head, rounded shoulders   Palpation:          TTP with concordant pain at suboccipital muscles and upper cervical paraspinals; TTP in lower cervical paraspinals, bilateral levator scapulae, and upper trapezius. ROM:      Flexion 60 degrees   Extension 30 degrees    SBL 40 degrees--painful local    SBR 40 degrees--painful local    Rot L 60 degrees--painful local    Rot R  60 degrees-painful local      Strength:          WNL UQS  Neurological Screen:        Dermatomes:  Intact, intermittent tingling reported in right foot (general) and right hand 4-5th fingers         Reflexes:  Equal and intact patellar, achilles, brachioradialis, and biceps         Neural Tension Tests:  Increased tension at medial forearm to fingers with initial elbow extension.     Functional Mobility:         Gait/Ambulation:  Normal         Transfers:  Independent            (SB=sidebending, Rot=rotation, TTP=tender to palpation, ULTTA=upper limb tension test-A, UQS=upper quarter scan, WNL=within normal limits; ROM measured in degrees)           Body Structures Involved:  1. Nerves  2. Joints  3. Muscles Body Functions Affected:  1. Sensory/Pain  2. Neuromusculoskeletal  3. Movement Related Activities and Participation Affected:  1. Self Care  2. Domestic Life  3. Interpersonal Interactions and Relationships  4.  Community, Social and [place]   Number of elements (examined above) that affect the Plan of Care: 4+: HIGH COMPLEXITY   CLINICAL PRESENTATION:   Presentation: Evolving clinical presentation with changing clinical characteristics: MODERATE COMPLEXITY   CLINICAL DECISION MAKING:   Use of outcome tool(s) and clinical judgement create a POC that gives a: Clear prediction of patient's progress: LOW COMPLEXITY

## 2020-05-21 ENCOUNTER — HOSPITAL ENCOUNTER (OUTPATIENT)
Dept: PHYSICAL THERAPY | Age: 35
Discharge: HOME | End: 2020-05-21
Payer: COMMERCIAL

## 2020-05-21 PROCEDURE — 97110 THERAPEUTIC EXERCISES: CPT

## 2020-05-21 PROCEDURE — 97140 MANUAL THERAPY 1/> REGIONS: CPT

## 2020-05-22 NOTE — PROGRESS NOTES
Nav Sanches  : 1985  Primary: [facility] [employer] Other  Secondary:  [facility] [address], [address] [facility]  [phone]   [phone]    OUTPATIENT PHYSICAL THERAPY: Daily Treatment Note 2020  Visit Count:  2    ICD-10: Treatment Diagnosis: Cervicalgia (M54.2), Headache (R51)  Precautions/Allergies:   Gluten and Mercury (elemental)   TREATMENT PLAN:  Effective Dates: 2020 TO 7/10/2020 (60 days). Frequency/Duration: 2 times a week for 60 Day(s)    Pre-treatment Symptoms/Complaints:  Pt reports that she has not been to the chiropractor since last Thursday and that she is feeling really tight and has a headache. Pain: Initial: Pain Intensity 1: 8 /10 Post Session:  4/10   Medications Last Reviewed:  2020  Updated Objective Findings:  None Today  TREATMENT:     Manual Therapy (    Soft Tissue Mobilization Duration  Duration: 40 Minutes): Manual techniques to facilitate improved motion and decreased pain. (Used abbreviations: MET - muscle energy technique; PNF - proprioceptive neuromuscular facilitation; NMR - neuromuscular re-education; a/p - anterior to posterior; p/a - posterior to anterior)   · STM to bilateral upper trapezius, levator scapulae, and cervical paraspinals  · PA mobilizations to cervical spine (grades II)  · Cervical traction  · Manual cervical ROM with contract relax techniques (flexion, sidebending)     Therapeutic Exercise: (15 Minutes):  Exercises per grid below to improve mobility and coordination. Required moderate verbal cues to promote proper body mechanics. Progressed range, repetitions and complexity of movement as indicated.      2020   Activity/Exercise Parameters                 Patient education Use of cervical traction device--set up and pressure advice   Cervical AROM  5 reps each direction in painfree range of motion    Thoracolumbar ROM  Child's pose, cat/cow    Scapular retractions Prone bilateral 2 x 10                    [employer] Portal  Treatment/Session Summary:    · Response to Treatment:  Pt reported improved range of motion but that she still feels tight at the end of her new range. She reported intermittent increase and decrease in headache symptoms during treatment depending on activity. She reported no change in tingling in UE with any intervention but increased tingling with palpation and mobilization of right upper trapezius. .  · Communication/Consultation:  None today  · Equipment provided today:  None today  · Recommendations/Intent for next treatment session: Next visit will focus on progression of pain control and mobility exercises.     Total Treatment Billable Duration:  55 minutes  PT Patient Time In/Time Out  Time In: 1430  Time Out: 0  Brayden Barfield PT    Future Appointments   Date Time Provider Juliocesar Herrera   5/27/2020 11:30 AM Aurora Noe [facility]   5/28/2020  9:30 AM Amanda ZEPEDA, PT [facility]   6/1/2020  2:30 PM Stiven Velasquez PT [facility]   6/3/2020 12:30 PM Amanda ZEPEDA, PT [facility]   6/8/2020  1:30 PM Amanda ZEPEDA, PT [facility]   6/10/2020  1:30 PM Amanda ZEPEDA, PT [facility]   6/15/2020  1:30 PM Amanda ZEPEDA, PT [facility]   6/17/2020  1:30 PM Cb Junior, PT [facility]

## 2020-05-27 ENCOUNTER — HOSPITAL ENCOUNTER (OUTPATIENT)
Dept: PHYSICAL THERAPY | Age: 35
Discharge: HOME | End: 2020-05-27
Payer: COMMERCIAL

## 2020-05-27 PROCEDURE — 97110 THERAPEUTIC EXERCISES: CPT

## 2020-05-27 PROCEDURE — 97140 MANUAL THERAPY 1/> REGIONS: CPT

## 2020-05-27 NOTE — PROGRESS NOTES
Sara Sherman  : 1985  Primary: [facility] Other  Secondary:  [facility] at [place] [address], [address] [facility]  [phone]   [phone]    OUTPATIENT PHYSICAL THERAPY: Daily Treatment Note 2020  Visit Count:  3    ICD-10: Treatment Diagnosis: Cervicalgia (M54.2), Headache (R51)  Precautions/Allergies:   Gluten and Mercury (elemental)   TREATMENT PLAN:  Effective Dates: 2020 TO 7/10/2020 (60 days). Frequency/Duration: 2 times a week for 60 Day(s)    Pre-treatment Symptoms/Complaints:  Pt reports that since her last visit, she has had an improvement in her upper trap mobility and general ability to move her head, but that her headache and neck symptoms have been worse. She reports that she has the most pain with sleeping/lying down and gets better with movement. She reports 6/10 headache intensity at beginning of session today. Pain: Initial: Pain Intensity 1: 6 /10 Post Session:  2/10   Medications Last Reviewed:  2020  Updated Objective Findings:  None Today  TREATMENT:     Manual Therapy (    Soft Tissue Mobilization Duration  Duration: 40 Minutes): Manual techniques to facilitate improved motion and decreased pain. (Used abbreviations: MET - muscle energy technique; PNF - proprioceptive neuromuscular facilitation; NMR - neuromuscular re-education; a/p - anterior to posterior; p/a - posterior to anterior)   · STM to bilateral upper trapezius, levator scapulae, and cervical paraspinals  · PA mobilizations to cervical spine (grades II)  · Cervical traction  · Manual cervical ROM with contract relax techniques (flexion, sidebending)   · Upper cervical nodding mobilization: bilateral and each unilateral     Therapeutic Exercise: (15 Minutes):  Exercises per grid below to improve mobility and coordination. Required moderate verbal cues to promote proper body mechanics.   Progressed range, repetitions and complexity of movement as indicated. 5/27/2020   Activity/Exercise Parameters                 Patient education Headache management at home with chin tuck, foam roll, etc    Cervical AROM  5 reps each direction in painfree range of motion    Thoracolumbar ROM  Child's pose, cat/cow    Scapular retractions  --    Chin tuck  2 x 10 reps supine    Foam roll  Supine upper cervical STM with rotations, thoracic extensions 2X thru, pec stretch 2 x 1 min            [employer] Portal  Treatment/Session Summary:    · Response to Treatment:  Pt with decreased headache symptoms following treatment today. Discussed home management techniques for headaches as well. · Communication/Consultation:  None today  · Equipment provided today:  None today  · Recommendations/Intent for next treatment session: Next visit will focus on progression of pain control and mobility exercises.     Total Treatment Billable Duration:  55 minutes  PT Patient Time In/Time Out  Time In: 1130  Time Out: 200  Tito Alberto PT    Future Appointments   Date Time Provider Juliocesar Herrera   5/28/2020  9:30 AM Aurora Crain [facility]   6/1/2020  2:30 PM Isaac Walton, INDY [facility]   6/3/2020 12:30 PM Stephani ZEPEDA, PT [facility]   6/8/2020  1:30 PM Stephani ZEPEDA, PT [facility]   6/10/2020  1:30 PM Stephani ZEPEDA, PT [facility]   6/15/2020  1:30 PM Stephani ZEPEDA, PT [facility]   6/17/2020  1:30 PM Gilmar Junior, PT [facility]

## 2020-05-28 ENCOUNTER — HOSPITAL ENCOUNTER (OUTPATIENT)
Dept: PHYSICAL THERAPY | Age: 35
Discharge: HOME | End: 2020-05-28
Payer: COMMERCIAL

## 2020-05-28 PROCEDURE — 97140 MANUAL THERAPY 1/> REGIONS: CPT

## 2020-05-28 NOTE — PROGRESS NOTES
Live Stiles  : 1985  Primary: Liane [facility] [employer] [facility] Other  Secondary:  [facility] [place] [address], [address] [facility]  [phone]   [phone]    OUTPATIENT PHYSICAL THERAPY: Daily Treatment Note 2020  Visit Count:  4    ICD-10: Treatment Diagnosis: Cervicalgia (M54.2), Headache (R51)  Precautions/Allergies:   Gluten and Mercury (elemental)   TREATMENT PLAN:  Effective Dates: 2020 TO 7/10/2020 (60 days). Frequency/Duration: 2 times a week for 60 Day(s)    Pre-treatment Symptoms/Complaints:  Pt reports that she felt better for about 2 hours after her last session, but that then her headache returned and that it was worse and has persisted until this AM.  She reports that it improves as she starts moving around. Pain: Initial: Pain Intensity 1: 4 /10 Post Session:  2/10   Medications Last Reviewed:  2020  Updated Objective Findings:  None Today  TREATMENT:     Manual Therapy (    Soft Tissue Mobilization Duration  Duration: 55 Minutes): Manual techniques to facilitate improved motion and decreased pain. (Used abbreviations: MET - muscle energy technique; PNF - proprioceptive neuromuscular facilitation; NMR - neuromuscular re-education; a/p - anterior to posterior; p/a - posterior to anterior)   · STM to bilateral upper trapezius, levator scapulae, and cervical paraspinals  · PA mobilizations to cervical spine (grades II)  · Cervical traction  · Manual cervical ROM with contract relax techniques (flexion, sidebending)   · Upper cervical nodding mobilization: bilateral and each unilateral   · IASTM to left upper trapezius and right cervical paraspinals at occipital attachment. Therapeutic Exercise: ( ):  Exercises per grid below to improve mobility and coordination. Required moderate verbal cues to promote proper body mechanics. Progressed range, repetitions and complexity of movement as indicated.      2020 Activity/Exercise Parameters                 Patient education Headache management at home with chin tuck, foam roll, etc    Cervical AROM  5 reps each direction in painfree range of motion    Thoracolumbar ROM  Child's pose, cat/cow    Scapular retractions  --    Chin tuck  2 x 10 reps supine    Foam roll  Supine upper cervical STM with rotations, thoracic extensions 2X thru, pec stretch 2 x 1 min            [employer] Portal  Treatment/Session Summary:    · Response to Treatment:  Pt continues to respond well within treatment session. Initiated IASTM today. Assess effectiveness next visit. · Communication/Consultation:  None today  · Equipment provided today:  None today  · Recommendations/Intent for next treatment session: Next visit will focus on progression of pain control and mobility exercises.     Total Treatment Billable Duration:  55 minutes  PT Patient Time In/Time Out  Time In: 0930  Time Out: 56  Catalina Wise PT    Future Appointments   Date Time Provider Juliocesar Herrera   6/1/2020  2:30 PM [facility]   6/3/2020 12:30 PM [facility]   6/8/2020  1:30 PM Norma Burn D, PT [facility]   6/10/2020  1:30 PM Norma Burn D, PT [facility]   6/15/2020  1:30 PM Hepler Burn D, PT [facility]   6/17/2020  1:30 PM [facility]ubham Junior[facility]

## 2020-06-01 ENCOUNTER — HOSPITAL ENCOUNTER (OUTPATIENT)
Dept: PHYSICAL THERAPY | Age: 35
Discharge: HOME | End: 2020-06-01
Payer: COMMERCIAL

## 2020-06-01 PROCEDURE — 97110 THERAPEUTIC EXERCISES: CPT

## 2020-06-01 PROCEDURE — 97140 MANUAL THERAPY 1/> REGIONS: CPT

## 2020-06-01 NOTE — PROGRESS NOTES
Ramin Troncoso  : 1985  Primary: [facility] [employer] Other  Secondary:  [facility] [address], [address] [facility]  [phone]   [phone]    OUTPATIENT PHYSICAL THERAPY: Daily Treatment Note 2020  Visit Count:  5    ICD-10: Treatment Diagnosis: Cervicalgia (M54.2), Headache (R51)  Precautions/Allergies:   Gluten and Mercury (elemental)   TREATMENT PLAN:  Effective Dates: 2020 TO 7/10/2020 (60 days). Frequency/Duration: 2 times a week for 60 Day(s)    Pre-treatment Symptoms/Complaints:  Pt reports that she felt better for several days after her last visit until she woke with discomfort this AM.  She reports right sided neck and shoulder discomfort and right finger tingling, which had temporarily resolved after last session. Pain: Initial: Pain Intensity 1: 3 10 Post Session:  2/10   Medications Last Reviewed:  2020  Updated Objective Findings:  None Today  TREATMENT:     Manual Therapy (    Soft Tissue Mobilization Duration  Duration: 45 Minutes): Manual techniques to facilitate improved motion and decreased pain. (Used abbreviations: MET - muscle energy technique; PNF - proprioceptive neuromuscular facilitation; NMR - neuromuscular re-education; a/p - anterior to posterior; p/a - posterior to anterior)   · STM to bilateral upper trapezius, levator scapulae, and cervical paraspinals  · PA mobilizations to cervical spine (grades II)  · Cervical traction  · Manual cervical ROM with contract relax techniques (flexion, sidebending)   · Upper cervical nodding mobilization: bilateral and each unilateral   · IASTM to right upper trapezius and right levator scapula      Therapeutic Exercise: (10 Minutes):  Exercises per grid below to improve mobility and coordination. Required moderate verbal cues to promote proper body mechanics. Progressed range, repetitions and complexity of movement as indicated.      2020   Activity/Exercise Parameters                 Patient education --   Cervical AROM  5 reps each direction in painfree range of motion    Thoracolumbar ROM  Child's pose, cat/cow --reviewed    Scapular retractions  --    Chin tuck  --   Foam roll  --   Snag mobilization with towel  3 minutes        [employer] Portal  Treatment/Session Summary:    · Response to Treatment:  Pt with persistent discomfort, but some improvement within session today. · Communication/Consultation:  None today  · Equipment provided today:  None today  · Recommendations/Intent for next treatment session: Next visit will focus on progression of pain control and mobility exercises.     Total Treatment Billable Duration:  55 minutes  PT Patient Time In/Time Out  Time In: 1430  Time Out: [phone]  Laurence Gongora PT    Future Appointments   Date Time Provider Juliocesar Herrera   6/3/2020 12[facility]   6/8/2020  1:30 PM Genoveva Fox PT [facility]   6/10/2020  1:30 [facility]   6/15/2020  1:30 PM Anaamria ZEPEDA PT [facility]   6/17/2020  1:30 PM Anastacio Junior, PT [facility]

## 2020-06-03 ENCOUNTER — HOSPITAL ENCOUNTER (OUTPATIENT)
Dept: PHYSICAL THERAPY | Age: 35
Discharge: HOME | End: 2020-06-03
Payer: COMMERCIAL

## 2020-06-03 PROCEDURE — 97110 THERAPEUTIC EXERCISES: CPT

## 2020-06-03 PROCEDURE — 97140 MANUAL THERAPY 1/> REGIONS: CPT

## 2020-06-03 NOTE — PROGRESS NOTES
Rosalva Ansari  : 1985  Primary: [facility] [facility] [facility] Other  Secondary:  [facility] [place] [address], [address] [facility]  [phone]   [phone]    OUTPATIENT PHYSICAL THERAPY: Daily Treatment Note 6/3/2020  Visit Count:  6    ICD-10: Treatment Diagnosis: Cervicalgia (M54.2), Headache (R51)  Precautions/Allergies:   Gluten and Mercury (elemental)   TREATMENT PLAN:  Effective Dates: 2020 TO 7/10/2020 (60 days). Frequency/Duration: 2 times a week for 60 Day(s)    Pre-treatment Symptoms/Complaints:  Pt reports that she was really sore after her last visit and that it lasted until last night. She reports no headache and no pain today except when she tries to move her head and that she has tightness in the right upper trap. Pain: Initial: Pain Intensity 1: 0 /10 Post Session:  2/10   Medications Last Reviewed:  6/3/2020  Updated Objective Findings:  None Today  TREATMENT:     Manual Therapy (    Soft Tissue Mobilization Duration  Duration: 20 Minutes): Manual techniques to facilitate improved motion and decreased pain. (Used abbreviations: MET - muscle energy technique; PNF - proprioceptive neuromuscular facilitation; NMR - neuromuscular re-education; a/p - anterior to posterior; p/a - posterior to anterior)   · STM to bilateral upper trapezius, levator scapulae, and cervical paraspinals  · PA mobilizations to cervical spine (grades II)  · Cervical traction  · Manual cervical ROM with contract relax techniques (flexion, sidebending)   · Upper cervical nodding mobilization: bilateral and each unilateral (not performed today)   · IASTM to right upper trapezius and right levator scapula  (not performed today)     Therapeutic Exercise: (38 Minutes):  Exercises per grid below to improve mobility and coordination. Required moderate verbal cues to promote proper body mechanics. Progressed range, repetitions and complexity of movement as indicated. 6/3/2020   Activity/Exercise Parameters                 Patient education --   Cervical AROM  5 reps each direction in painfree range of motion regular, rotation with snag, sidebending to right with first rib mobilization    Thoracolumbar ROM  Child's pose, cat/cow    Scapular retractions  Prone bilateral: 10 alone, 2 x 10 I's, 2 x 10 T's    Chin tuck  3 x 10    Foam roll  Neck rolls x 2 minutes, thoracic extension x 2, pec stretch 2 x 1 min    Snag mobilization with towel  --   Supine shoulder protraction  3 x 10                [facility]  Treatment/Session Summary:    · Response to Treatment:  Pt with good tolerance with increased exercise component today due to decreased pain overall. She reports headache approximately 8/10 days instead of 10/10 days. · Communication/Consultation:  None today  · Equipment provided today:  None today  · Recommendations/Intent for next treatment session: Next visit will focus on progression of pain control and mobility exercises.     Total Treatment Billable Duration:  58 minutes  PT Patient Time In/Time Out  Time In: 1230  Time Out: [address], PT    Future Appointments   Date Time Provider Juliocesar Herrera   6/8/2020  1:30 PM Aurora Belcher [facility]   6/10/2020  1:30 PM Gulshan Martinez PT [facility]   6/15/2020  1:30 PM Gulshan Martinez PT [facility]   6/17/2020  1:30 PM Black, Johnice Meigs, IN[facility]

## 2020-06-09 ENCOUNTER — HOSPITAL ENCOUNTER (OUTPATIENT)
Dept: PHYSICAL THERAPY | Age: 35
Discharge: HOME | End: 2020-06-09
Payer: COMMERCIAL

## 2020-06-09 PROCEDURE — 97140 MANUAL THERAPY 1/> REGIONS: CPT

## 2020-06-09 PROCEDURE — 97110 THERAPEUTIC EXERCISES: CPT

## 2020-06-09 NOTE — PROGRESS NOTES
Lane Alarcon  : 1985  Primary: [facility] [employer] Other  Secondary:  [facility] [place] [address], [address] [phone]   [phone]    OUTPATIENT PHYSICAL THERAPY: Daily Treatment Note 2020  Visit Count:  7    ICD-10: Treatment Diagnosis: Cervicalgia (M54.2), Headache (R51)  Precautions/Allergies:   Gluten and Mercury (elemental)   TREATMENT PLAN:  Effective Dates: 2020 TO 7/10/2020 (60 days). Frequency/Duration: 2 times a week for 60 Day(s)    Pre-treatment Symptoms/Complaints:  Pt reports that in the last several days, she has only had headache 2 times and that it was short lived. She reports that she is feeling a lot better. Pain: Initial: Pain Intensity 1: 0 /10 Post Session:  210   Medications Last Reviewed:  2020  Updated Objective Findings:  None Today  TREATMENT:     Manual Therapy (    Soft Tissue Mobilization Duration  Duration: 25 Minutes): Manual techniques to facilitate improved motion and decreased pain. (Used abbreviations: MET - muscle energy technique; PNF - proprioceptive neuromuscular facilitation; NMR - neuromuscular re-education; a/p - anterior to posterior; p/a - posterior to anterior)   · STM to bilateral upper trapezius, levator scapulae, and cervical paraspinals  · PA mobilizations to cervical spine (grades II)  · Cervical traction  · Manual cervical ROM with contract relax techniques (flexion, sidebending)   · Upper cervical nodding mobilization: bilateral and each unilateral (not performed today)   · IASTM to right upper trapezius and right levator scapula  (not performed today)     Therapeutic Exercise: (30 Minutes):  Exercises per grid below to improve mobility and coordination. Required moderate verbal cues to promote proper body mechanics. Progressed range, repetitions and complexity of movement as indicated.      2020   Activity/Exercise Parameters                 Patient education --   Cervical AROM  5 reps each direction in painfree range of motion regular, sidebending to right with first rib mobilization    Thoracolumbar ROM  Child's pose, cat/cow, quadruped rotations    Scapular retractions  Prone bilateral: 2 x 10 I's, 2 x 10 T's on ball---printed for HeP    Chin tuck  3 x 10    Foam roll  --   Snag mobilization with towel  --   Supine shoulder protraction  3 x 10--2 sets with chin tuck                [facility] Portal  Treatment/Session Summary:    · Response to Treatment:  Pt with significant improvement in pain frequency and duration over last week. She also reports an increase in activity level with going to spinning 3 times and other activities at home that have not exacerbated symptoms. Pt plans to attend yoga sculpt this week to see how addition of UE exercises affects her pain. · Communication/Consultation:  None today   · Equipment provided today:  None today  · Recommendations/Intent for next treatment session: Next visit will focus on progression of pain control and mobility exercises.     Total Treatment Billable Duration:  55 minutes  PT Patient Time In/Time Out  Time In: 0830  Time Out: 0930  Halley Zabala PT    Future Appointments   Date Time Provider Juliocesar Herrera   6/11/2020  8:30 AM Aurora Travis [facility]   6/15/2020  1:30 PM Adri Bird PT [facility]   6/17/2020  1:30 PM Kee Junior PT [facility]

## 2020-06-11 ENCOUNTER — HOSPITAL ENCOUNTER (OUTPATIENT)
Dept: PHYSICAL THERAPY | Age: 35
Discharge: HOME | End: 2020-06-11
Payer: COMMERCIAL

## 2020-06-11 PROCEDURE — 97110 THERAPEUTIC EXERCISES: CPT

## 2020-06-11 PROCEDURE — 97140 MANUAL THERAPY 1/> REGIONS: CPT

## 2020-06-11 NOTE — PROGRESS NOTES
Gary Case  : 1985  Primary: [facility] [facility] Other  Secondary:  [facility], [address]  [phone]   [phone]    OUTPATIENT PHYSICAL THERAPY: Daily Treatment Note 2020  Visit Count:  8    ICD-10: Treatment Diagnosis: Cervicalgia (M54.2), Headache (R51)  Precautions/Allergies:   Gluten and Mercury (elemental)   TREATMENT PLAN:  Effective Dates: 2020 TO 7/10/2020 (60 days). Frequency/Duration: 2 times a week for 60 Day(s)    Pre-treatment Symptoms/Complaints:  Pt reports that she has not had any headache or significant pain since her last appointment but that she does still notice restriction with turning her head. Pain: Initial: Pain Intensity 1: 0 /10 Post Session:  0/10   Medications Last Reviewed:  2020  Updated Objective Findings:  None Today  TREATMENT:     Manual Therapy (    Soft Tissue Mobilization Duration  Duration: 25 Minutes): Manual techniques to facilitate improved motion and decreased pain. (Used abbreviations: MET - muscle energy technique; PNF - proprioceptive neuromuscular facilitation; NMR - neuromuscular re-education; a/p - anterior to posterior; p/a - posterior to anterior)   · STM to bilateral upper trapezius, levator scapulae, and cervical paraspinals  · PA mobilizations to cervical spine (grades II)  · Cervical traction  · Manual cervical ROM with contract relax techniques (flexion, sidebending)   · Upper cervical nodding mobilization: bilateral and each unilateral (not performed today)   · IASTM to right upper trapezius and right levator scapula  (not performed today)     Therapeutic Exercise: (30 Minutes):  Exercises per grid below to improve mobility and coordination. Required moderate verbal cues to promote proper body mechanics. Progressed range, repetitions and complexity of movement as indicated.      2020   Activity/Exercise Parameters                 Patient education --   Cervical AROM  Rotation 5 reps and levator scapula stretch 2 reps on right    Thoracolumbar ROM  Child's pose, cat/cow   Scapular retractions  --   Chin tuck  3 x 10    Foam roll  Thoracic extension over foam roll: 2 times thru    Snag mobilization with towel  --   Supine shoulder protraction  --   Rows and punches  Green band: emphasis on scapular control 3 x 10 each    PNF total gym  7.5 lbs, 2 x 10 each diagonal each side; flexion patterns only        [facility] Portal  Treatment/Session Summary:    · Response to Treatment:  Pt continues to report improved symptoms as activity level increases. If symptom management maintains, plan to progress to independence over next 1-2 weeks. · Communication/Consultation:  None today   · Equipment provided today:  None today  · Recommendations/Intent for next treatment session: Next visit will focus on progression of pain control and mobility exercises.     Total Treatment Billable Duration:  55 minutes  PT Patient Time In/Time Out  Time In: 0830  Time Out: 0930  Brayden Barfield PT    Future Appointments   Date Time Provider Juliocesar Herrera   6/15/2020  1:30 PM Aurora Noe OFF [facility]   6/17/2020  1:30 PM Cb Junior PT [facility]

## 2020-06-17 ENCOUNTER — HOSPITAL ENCOUNTER (OUTPATIENT)
Dept: PHYSICAL THERAPY | Age: 35
Discharge: HOME | End: 2020-06-17
Payer: COMMERCIAL

## 2020-06-17 PROCEDURE — 97110 THERAPEUTIC EXERCISES: CPT

## 2020-06-17 PROCEDURE — 97140 MANUAL THERAPY 1/> REGIONS: CPT

## 2020-06-17 NOTE — PROGRESS NOTES
Lalo Song  : 1985  Primary: [facility] [facility] [facility] [facility] Other  Secondary:  [facility] [place] [address], [address] [facility]  [phone]   [phone]    OUTPATIENT PHYSICAL THERAPY: Daily Treatment Note 2020  Visit Count:  9    ICD-10: Treatment Diagnosis: Cervicalgia (M54.2), Headache (R51)  Precautions/Allergies:   Gluten and Mercury (elemental)   TREATMENT PLAN:  Effective Dates: 2020 TO 2020 (30 days). Frequency/Duration: 2 times a week for 30 Days    Pre-treatment Symptoms/Complaints:  Pt reports that she has continued to do well with neck pain/headaches over the last several days, but that when she was doing pump class and pilates she felt blockage/pain in her right shoulder and cramping in her mid back that limited her participation. Pain: Initial: Pain Intensity 1: 0 /10 Post Session:  0/10   Medications Last Reviewed:  2020  Updated Objective Findings:  See recertification note from today   TREATMENT:     Manual Therapy (    Soft Tissue Mobilization Duration  Duration: 25 Minutes): Manual techniques to facilitate improved motion and decreased pain. (Used abbreviations: MET - muscle energy technique; PNF - proprioceptive neuromuscular facilitation; NMR - neuromuscular re-education; a/p - anterior to posterior; p/a - posterior to anterior)   · STM to bilateral upper trapezius, levator scapulae, and cervical paraspinals  · STM to right thoracic paraspinals, latissimus dorsi   · PA mobilizations to cervical and thoracic spine (grades II)  · Cervical traction  · Manual cervical ROM with contract relax techniques (flexion, sidebending)   · Upper cervical nodding mobilization: bilateral and each unilateral (not performed today)   · IASTM to right upper trapezius and right levator scapula  (not performed today)     Therapeutic Exercise: (30 Minutes):  Exercises per grid below to improve mobility and coordination.   Required moderate verbal cues to promote proper body mechanics. Progressed range, repetitions and complexity of movement as indicated. 6/17/2020   Activity/Exercise Parameters                 Patient education --   Cervical AROM  All directions 5 reps    Thoracolumbar ROM  Child's pose, cat/cow, quadruped rotation    Scapular retractions  Unilateral Y's on table: 2 x 10    Chin tuck  --   Foam roll  Thoracic extension over foam roll: 2 times thru    Snag mobilization with towel  --   Supine shoulder protraction  Prone on elbows: discontinued after 15 due to cramping    Rows and punches  --   PNF total gym  --   Horizontal abduction  2 x 10 with orange band, 10 each diagonal with orange band as well        [facility] Portal  Treatment/Session Summary:    · Response to Treatment:  Pt with steady gains demonstrated in mobility and scapular stability, but does still require modification or cuing with higher level activities, consistent with discomfort during gym workouts. Plan to continue to facilitate cervical and scapular stability to facilitate painfree return to prior level of function. · Communication/Consultation:  None today   · Equipment provided today:  None today  · Recommendations/Intent for next treatment session: Next visit will focus on progression of pain control and mobility exercises.     Total Treatment Billable Duration:  55 minutes  PT Patient Time In/Time Out  Time In: 1330  Time Out: 0  Luis Hyde PT    Future Appointments   Date Time Provider Juliocesar Herrera   6/19/2020 12:30 PM [facility]

## 2020-06-17 NOTE — THERAPY RECERTIFICATION
Live Stiles  : 1985  Primary: [facility] Other  Secondary:  [facility] [address]  [phone]   [phone]       OUTPATIENT PHYSICAL THERAPY:Recertification    ICD-10: Treatment Diagnosis: Cervicalgia (M54.2), Headache (R51)  Precautions/Allergies:   Gluten and Mercury (elemental)   TREATMENT PLAN:  Effective Dates: 2020 TO 2020 (30 days). Frequency/Duration: 2 times a week for 30 Days   MEDICAL/REFERRING DIAGNOSIS:  Cervicalgia [M54.2]  Other chronic pain [G89.29]   DATE OF ONSET: mid-February following playing [employer]  REFERRING PHYSICIAN: JESSICA Landin MD Orders: evaluate and treat   Return MD Appointment: as needed      INITIAL ASSESSMENT:  Ms. Blake Figueroa is a 38year old female with neck pain, headache, and UE/LE tingling that began following playing [employer] in mid-February. She reports no injury of mechanism and that tingling has gone from bilateral to right side only and is intermittent and decreasing in frequency. She reports that neck pain and headache are still daily and very limiting to her activity level. She reports difficulty with any increased activity level such as racquetball, jogging, or exercising; holding her son, especially overhead; gardening; and looking down to do daily tasks such as cooking. She demonstrates muscular hypertonicity and tenderness upon palpation that are consistent with headache and neck pain symptoms and increased neural tension with ULTT-A that is consistent with her UE symptoms. She does not demonstrate any other s/s consistent with upper cervical instability/upper motor neuron symptoms or radiculopathy. She will benefit from skilled PT to address above listed impairments and functional limitations to facilitate return to prior level of function.    20: Cayetano Lay has demonstrated good to excellent improvement in all therapy goals at this time. She continues to have occasional headaches and intermittent neck pain dependent on activity. She continues to improve, but has not fully returned to her active lifestyle and will continue to require PT to progress home program and facilitate return to prior level of function. PROBLEM LIST (Impacting functional limitations):  1. Decreased ADL/Functional Activities  2. Increased Pain  3. Decreased Activity Tolerance  4. Decreased Flexibility/Joint Mobility INTERVENTIONS PLANNED: (Treatment may consist of any combination of the following)  1. Cryotherapy  2. Heat  3. Home Exercise Program (HEP)  4. Manual Therapy  5. Neuromuscular Re-education/Strengthening  6. Range of Motion (ROM)  7. Therapeutic Activites  8. Therapeutic Exercise/Strengthening     GOALS: (Goals have been discussed and agreed upon with patient.)  Short-Term Functional Goals: Time Frame: by 6/8/20  1. Pt will report headache frequency of 3 a week or less. Achieved 6/17/20  2. Pt will report minimal to no pain with looking down to perform normal daily tasks (cooking, etc). Achieved 6/17/20   Discharge Goals: Time Frame: by 7/10/20   1. Pt will report no limitation in being active 5-7 days per week for 1-2 hours a day due to pain. Good progress as of 6/17/20   2. Pt will report no limitation in gardening or doing housework for 1 hour at a time due to pain. Some progress as of 6/17/20 (not tried)   3. Pt will report headache frequency of 0-1 per week. Excellent progress as of 6/17/20   4. Pt will demonstrate improvement in function with NDI to 12% or less disability. Good progress as of 6/17/20     OUTCOME MEASURE:   Tool Used: Neck Disability Index (NDI)  Score:  Initial: 29/50 (58% disability)   6/17/20: 10/50 (20% disability)    Interpretation of Score: The Neck Disability Index is a revised form of the Oswestry Low Back Pain Index and is designed to measure the activities of daily living in person's with neck pain.   Each section is scored on a 0-5 scale, 5 representing the greatest disability. The scores of each section are added together for a total score of 50. UPDATED OBJECTIVE FINDINGS:    Observation/Orthostatic Postural Assessment:          Mild forward head, rounded shoulders         Anterior deviation of humeral head during overhead flexion R>L   Palpation:          TTP with concordant pain at suboccipital muscles and upper cervical paraspinals; TTP in lower cervical paraspinals, bilateral levator scapulae, and upper trapezius. Decreased TTP compared to initial evaluation, but still present   ROM:      Flexion 60 degrees   Extension 50 degrees    SBL 50 degrees   SBR 50 degrees   Rot L 60 degrees-tightness local    Rot R  60 degrees-tightness local      Strength:          WNL UQS  Neurological Screen:        Dermatomes:  Intact, no tingling reported in right foot (general) and right hand 4-5th fingers         Reflexes:  Equal and intact patellar, achilles, brachioradialis, and biceps         Neural Tension Tests:  Increased tension at medial forearm to fingers with terminal elbow extension. Functional Mobility:         Gait/Ambulation:  Normal         Transfers:  Independent            (SB=sidebending, Rot=rotation, TTP=tender to palpation, ULTTA=upper limb tension test-A, UQS=upper quarter scan, WNL=within normal limits; ROM measured in degrees)    MEDICAL NECESSITY:   · Patient is expected to demonstrate progress in soft tissue mobility to increase independence with high level activities such as racquetball and gardening. .  REASON FOR SERVICES/OTHER COMMENTS:  · Patient continues to require modification of therapeutic interventions to increase complexity of exercises. Total Duration:       Rehabilitation Potential For Stated Goals: Good  Regarding Shanna Gerber's therapy, I certify that the treatment plan above will be carried out by a therapist or under their direction.   Thank you for this referral,  Joey Suazo PT     Referring Physician Signature:  JESSICA Geller _______________________________ Date _____________

## 2020-06-19 ENCOUNTER — APPOINTMENT (OUTPATIENT)
Dept: PHYSICAL THERAPY | Age: 35
End: 2020-06-19
Payer: COMMERCIAL

## 2020-07-06 ENCOUNTER — HOSPITAL ENCOUNTER (OUTPATIENT)
Dept: PHYSICAL THERAPY | Age: 35
Discharge: HOME | End: 2020-07-06
Payer: COMMERCIAL

## 2020-07-06 PROCEDURE — 97140 MANUAL THERAPY 1/> REGIONS: CPT

## 2020-07-06 NOTE — PROGRESS NOTES
Nancy Scotty  : 1985  Primary: Gilford Schwartz [employer] Other  Secondary:  [facility], [address] [facility]  [phone]   [phone]    OUTPATIENT PHYSICAL THERAPY: Daily Treatment Note 2020  Visit Count:  10    ICD-10: Treatment Diagnosis: Cervicalgia (M54.2), Headache (R51)  Precautions/Allergies:   Gluten and Mercury (elemental)   TREATMENT PLAN:  Effective Dates: 2020 TO 2020 (30 days). Frequency/Duration: 2 times a week for 30 Days    Pre-treatment Symptoms/Complaints:  Pt reports that she has done pretty well in the time that she has been out of therapy until this past weekend. She reports that she went stand up paddleboarding and that her neck went into spasms following and that caused a migraine. She reports that she is better now than 2 days ago, but still in pain and limited in ROM. Pain: Initial: Pain Intensity 1: 5 /10 Post Session:  0/10   Medications Last Reviewed:  2020  Updated Objective Findings:   TTP and spasm in bilateral cervical erector spinae, right upper trapezius, and right levator scapula   TREATMENT:     Manual Therapy (    Soft Tissue Mobilization Duration  Duration: 55 Minutes): Manual techniques to facilitate improved motion and decreased pain.  (Used abbreviations: MET - muscle energy technique; PNF - proprioceptive neuromuscular facilitation; NMR - neuromuscular re-education; a/p - anterior to posterior; p/a - posterior to anterior)   · STM to bilateral upper trapezius, levator scapulae, and cervical paraspinals  · STM to right thoracic paraspinals, latissimus dorsi   · PA mobilizations to cervical and thoracic spine (grades II)  · Cervical traction  · Manual cervical ROM with contract relax techniques (flexion, sidebending)   · Upper cervical nodding mobilization: bilateral and each unilateral (not performed today)   · IASTM to bilateral cervical paraspinals C4-6      Therapeutic Exercise: ( ):  Exercises per grid below to improve mobility and coordination. Required moderate verbal cues to promote proper body mechanics. Progressed range, repetitions and complexity of movement as indicated. 7/6/2020   Activity/Exercise Parameters                 Patient education --   Cervical AROM  All directions 5 reps    Thoracolumbar ROM  Child's pose, cat/cow, quadruped rotation    Scapular retractions  Unilateral Y's on table: 2 x 10    Chin tuck  --   Foam roll  Thoracic extension over foam roll: 2 times thru    Snag mobilization with towel  --   Supine shoulder protraction  Prone on elbows: discontinued after 15 due to cramping    Rows and punches  --   PNF total gym  --   Horizontal abduction  2 x 10 with orange band, 10 each diagonal with orange band as well        [employer] Portal  Treatment/Session Summary:    · Response to Treatment:  Pt returns to therapy after a gap in care due to waiting for insurance authorization. She reports good tolerance and increasing levels of function during that time until recent exacerbation of symptoms. She responded well to manual treatment today. Plan to progress to more active treatments next visit. · Communication/Consultation:  None today   · Equipment provided today:  None today  · Recommendations/Intent for next treatment session: Next visit will focus on progression of pain control and mobility exercises.     Total Treatment Billable Duration:  55 minutes  PT Patient Time In/Time Out  Time In: 1039  Time Out: [phone]  [facility], PT    Future Appointments   Date Time Provider Juliocesar Herrera   7/16/2020  1:30 PM Aurora Juan [facility]   7/23/2020  1:30 PM Luis Junior, IN[facility]

## 2020-07-10 ENCOUNTER — HOSPITAL ENCOUNTER (OUTPATIENT)
Dept: PHYSICAL THERAPY | Age: 35
Discharge: HOME | End: 2020-07-10
Payer: COMMERCIAL

## 2020-07-10 PROCEDURE — 97140 MANUAL THERAPY 1/> REGIONS: CPT

## 2020-07-10 NOTE — PROGRESS NOTES
Dia Headley  : 1985  Primary: [facility] Other  Secondary:  Therapy Center at [facility] [address], [address] [facility]  [phone]   [phone]    OUTPATIENT PHYSICAL THERAPY: Daily Treatment Note 7/10/2020  Visit Count:  11    ICD-10: Treatment Diagnosis: Cervicalgia (M54.2), Headache (R51)  Precautions/Allergies:   Gluten and Mercury (elemental)   TREATMENT PLAN:  Effective Dates: 2020 TO 2020 (30 days). Frequency/Duration: 2 times a week for 30 Days    Pre-treatment Symptoms/Complaints:  Pt reports that she is not feeling as bad as last visit but that she is still having a good amount of discomfort and pain in her right side. Pain: Initial: Pain Intensity 1: 3 10 Post Session:  5/10   Medications Last Reviewed:  7/10/2020  Updated Objective Findings:   TTP and spasm in bilateral cervical erector spinae, right upper trapezius, and right levator scapula   TREATMENT:     Manual Therapy (    Soft Tissue Mobilization Duration  Duration: 40 Minutes): Manual techniques to facilitate improved motion and decreased pain. (Used abbreviations: MET - muscle energy technique; PNF - proprioceptive neuromuscular facilitation; NMR - neuromuscular re-education; a/p - anterior to posterior; p/a - posterior to anterior)   · STM to bilateral upper trapezius, levator scapulae, and cervical paraspinals  · STM to right thoracic paraspinals, latissimus dorsi   · PA mobilizations to cervical and thoracic spine (grades II)  · Cervical traction  · Manual cervical ROM with contract relax techniques (flexion, sidebending)   · Upper cervical nodding mobilization: bilateral and each unilateral (not performed today)   · IASTM to right upper trapezius      Therapeutic Exercise: ( ):  Exercises per grid below to improve mobility and coordination. Required moderate verbal cues to promote proper body mechanics.   Progressed range, repetitions and complexity of movement as indicated. 7/10/2020   Activity/Exercise Parameters                 Patient education --   Cervical AROM  All directions 5 reps    Thoracolumbar ROM  Child's pose, cat/cow, quadruped rotation    Scapular retractions  Unilateral Y's on table: 2 x 10    Chin tuck  --   Foam roll  Thoracic extension over foam roll: 2 times thru    Snag mobilization with towel  --   Supine shoulder protraction  Prone on elbows: discontinued after 15 due to cramping    Rows and punches  --   PNF total gym  --   Horizontal abduction  2 x 10 with orange band, 10 each diagonal with orange band as well      Modalities (10 minutes): post treatment seated to right shoulder. Skin clear following. [employer] Portal  Treatment/Session Summary:    · Response to Treatment:  Pt reported significant post treatment soreness that improved somewhat with moist heat, but is typical with IASTM to upper trapezius. Will assess treatment effect next visit. · Communication/Consultation:  None today   · Equipment provided today:  None today  · Recommendations/Intent for next treatment session: Next visit will focus on progression of pain control and mobility exercises.     Total Treatment Billable Duration:  40 minutes  PT Patient Time In/Time Out  Time In: 1435  Time Out: 0  Aziza Burris PT    Future Appointments   Date Time Provider Juliocesar Herrera   7/15/2020  7:30 AM Aurora Kline [facility]   7/23/2020  1:30 PM Lilian Junior PT [facility]

## 2020-07-15 ENCOUNTER — HOSPITAL ENCOUNTER (OUTPATIENT)
Dept: PHYSICAL THERAPY | Age: 35
Discharge: HOME | End: 2020-07-15
Payer: COMMERCIAL

## 2020-07-15 PROCEDURE — 97140 MANUAL THERAPY 1/> REGIONS: CPT

## 2020-07-15 PROCEDURE — 97110 THERAPEUTIC EXERCISES: CPT

## 2020-07-15 NOTE — PROGRESS NOTES
Yeny Bland  : 1985  Primary: [facility] Other  Secondary:  Therapy Center at [facility] [address], [address] [phone]   [phone]    OUTPATIENT PHYSICAL THERAPY: Daily Treatment Note 7/15/2020  Visit Count:  12    ICD-10: Treatment Diagnosis: Cervicalgia (M54.2), Headache (R51)  Precautions/Allergies:   Gluten and Mercury (elemental)   TREATMENT PLAN:  Effective Dates: 2020 TO 2020 (30 days). Frequency/Duration: 2 times a week for 30 Days    Pre-treatment Symptoms/Complaints:  Pt reports that she was really sore the rest of the day on Friday and sore, but less so, on Saturday, but then felt a little bit better -today. She reports that she is still having right sided discomfort and tightness and headaches that come in waves. Pain: Initial: Pain Intensity 1: 3 10 Post Session:  5/10   Medications Last Reviewed:  7/15/2020  Updated Objective Findings:   Less hypertonicity in right upper trapezius, persistent hypertonicity and pain in right levator scapula and suboccipital muscles. TREATMENT:     Manual Therapy (    Soft Tissue Mobilization Duration  Duration: 45 Minutes): Manual techniques to facilitate improved motion and decreased pain.  (Used abbreviations: MET - muscle energy technique; PNF - proprioceptive neuromuscular facilitation; NMR - neuromuscular re-education; a/p - anterior to posterior; p/a - posterior to anterior)   · STM to bilateral upper trapezius, levator scapulae, and cervical paraspinals  · STM to right thoracic paraspinals, latissimus dorsi   · PA mobilizations to cervical and thoracic spine (grades II)  · Cervical traction  · Manual cervical ROM with contract relax techniques (flexion, sidebending)   · Upper cervical nodding mobilization: bilateral and each unilateral (not performed today)   · IASTM to right levator scapula and right occipital attachment of cervical paraspinals Therapeutic Exercise: (10 Minutes):  Exercises per grid below to improve mobility and coordination. Required moderate verbal cues to promote proper body mechanics. Progressed range, repetitions and complexity of movement as indicated. 7/15/2020   Activity/Exercise Parameters                 Patient education --   Cervical AROM  All directions 5 reps, 5 each side of 3 finger rotation    Thoracolumbar ROM  --   Scapular retractions  Unilateral Y's on table: 2 x 10    Chin tuck  --   Foam roll  --    Snag mobilization with towel  5 each side    Supine shoulder protraction  --   Rows and punches  --   PNF total gym  --   Horizontal abduction  --        [employer] Portal  Treatment/Session Summary:    · Response to Treatment:  Pt reported good improvement in baseline right sided discomfort, but still feels uncomfortable with left rotation. Decreased hypertonicity noted with IASTM today. Pt to focus on snag, 3 finger rotations, and levator stretch for next several days. · Communication/Consultation:  None today   · Equipment provided today:  None today  · Recommendations/Intent for next treatment session: Next visit will focus on progression of pain control and mobility exercises.     Total Treatment Billable Duration:  55 minutes  PT Patient Time In/Time Out  Time In: 0730  Time Out: 0830  Akash Milton PT    Future Appointments   Date Time Provider Juliocesar Herrera   7/20/2020  7:30 AM Aurora Barone   7/23/2020  7:30 AM INDY Barone [facility]   7/27/2020  7:30 AM Rolo ZEPEDA PT [facility]   7/29/2020  7:30 AM Abi Junior PT [facility]

## 2020-07-20 ENCOUNTER — HOSPITAL ENCOUNTER (OUTPATIENT)
Dept: PHYSICAL THERAPY | Age: 35
Discharge: HOME | End: 2020-07-20
Payer: COMMERCIAL

## 2020-07-20 PROCEDURE — 97140 MANUAL THERAPY 1/> REGIONS: CPT

## 2020-07-20 PROCEDURE — 97110 THERAPEUTIC EXERCISES: CPT

## 2020-07-20 NOTE — PROGRESS NOTES
Adali Good  : 1985  Primary: [facility] Other  Secondary:  Therapy Center at [facility] [address], [address] [facility]  [phone]   [phone]    OUTPATIENT PHYSICAL THERAPY: Daily Treatment Note 2020  Visit Count:  13    ICD-10: Treatment Diagnosis: Cervicalgia (M54.2), Headache (R51)  Precautions/Allergies:   Gluten and Mercury (elemental)   TREATMENT PLAN:  Effective Dates: 2020 TO 2020 (60 days). Frequency/Duration: 2 times a week for 60 Days    Pre-treatment Symptoms/Complaints:  Pt reports that she is doing steadily better. She continues to report discomfort with turning her head on the right side and headaches that come in waves, but that the intensity is much less than last week. Pain: Initial: Pain Intensity 1: 1 /10 Post Session:  5/10   Medications Last Reviewed:  2020  Updated Objective Findings:  See recertification    TREATMENT:     Manual Therapy (    Soft Tissue Mobilization Duration  Duration: 30 Minutes): Manual techniques to facilitate improved motion and decreased pain. (Used abbreviations: MET - muscle energy technique; PNF - proprioceptive neuromuscular facilitation; NMR - neuromuscular re-education; a/p - anterior to posterior; p/a - posterior to anterior)   · STM to bilateral upper trapezius, levator scapulae, and cervical paraspinals  · STM to right thoracic paraspinals, latissimus dorsi (not performed)   · PA mobilizations to cervical and thoracic spine (grades II)  · Cervical traction  · Manual cervical ROM with contract relax techniques (flexion, sidebending)   · Upper cervical nodding mobilization: bilateral and each unilateral    · IASTM to right levator scapula       Therapeutic Exercise: (25 Minutes):  Exercises per grid below to improve mobility and coordination. Required moderate verbal cues to promote proper body mechanics.   Progressed range, repetitions and complexity of movement as indicated. 7/20/2020   Activity/Exercise Parameters                 Patient education --   Cervical AROM  All directions 5 reps, 5 each side of 3 finger rotation    Thoracolumbar ROM  Thoracic extension over foam roll    Scapular retractions  2 x 10 each I's and T's    Chin tuck  2 x 10    Foam roll  --    Snag mobilization with towel  --   Supine shoulder protraction  2 x 10 with static tuck   Rows and punches  --   PNF total gym  --   Horizontal abduction  --        [employer] Portal  Treatment/Session Summary:    · Response to Treatment:  Pt is making steady improvement from recent exacerbation of symptoms and progressing towards independence with symptom management. · Communication/Consultation:  None today   · Equipment provided today:  None today  · Recommendations/Intent for next treatment session: Next visit will focus on progression of pain control and mobility exercises.     Total Treatment Billable Duration:  55 minutes  PT Patient Time In/Time Out  Time In: 0730  Time Out: 0830  Alex Lema PT    Future Appointments   Date Time Provider Juliocesar Herrera   7/23/2020  7:30 AM Aurora Herr   7/27/2020  7:30 AM Pito Wagner PT [facility]   7/29/2020  2:30 PM Tri Junior PT [facility]

## 2020-07-20 NOTE — THERAPY RECERTIFICATION
Sanchezyenny Rodriguez  : 1985  Primary: [facility] Other  Secondary:  Therapy Center at [facility] [address], [address] [facility]  [phone]   [phone]       OUTPATIENT PHYSICAL THERAPY:Recertification [id number]   ICD-10: Treatment Diagnosis: Cervicalgia (M54.2), Headache (R51)  Precautions/Allergies:   Gluten and Mercury (elemental)   TREATMENT PLAN:  Effective Dates: 2020 TO 2020 (60 days). Frequency/Duration: 2 times a week for 60 Days     MEDICAL/REFERRING DIAGNOSIS:  Cervicalgia [M54.2]  Other chronic pain [G89.29]   DATE OF ONSET: mid-February following playing [employer]  REFERRING PHYSICIAN: JESSICA Curran MD Orders: evaluate and treat   Return MD Appointment: as needed      INITIAL ASSESSMENT:  Ms. Dulce Briones is a 38year old female with neck pain, headache, and UE/LE tingling that began following playing [employer] in mid-February. She reports no injury of mechanism and that tingling has gone from bilateral to right side only and is intermittent and decreasing in frequency. She reports that neck pain and headache are still daily and very limiting to her activity level. She reports difficulty with any increased activity level such as racquetball, jogging, or exercising; holding her son, especially overhead; gardening; and looking down to do daily tasks such as cooking. She demonstrates muscular hypertonicity and tenderness upon palpation that are consistent with headache and neck pain symptoms and increased neural tension with ULTT-A that is consistent with her UE symptoms. She does not demonstrate any other s/s consistent with upper cervical instability/upper motor neuron symptoms or radiculopathy. She will benefit from skilled PT to address above listed impairments and functional limitations to facilitate return to prior level of function.    20: Anuradha Carrero has demonstrated good to excellent improvement in all therapy goals at this time. She continues to have occasional headaches and intermittent neck pain dependent on activity. She continues to improve, but has not fully returned to her active lifestyle and will continue to require PT to progress home program and facilitate return to prior level of function. 9/95/29: Since last recertification, patient was absent from therapy for a few weeks due to waiting for insurance authorization. She reported good tolerance and maintenance of symptoms during that time until the week before she restarted therapy, she reported re-aggravation of symptoms. Over the last 4 sessions, she has again improved significantly in pain intensity, headache frequency, and activity tolerance. She will continue to benefit from skilled PT to manage pain and transition to independent home program.    PROBLEM LIST (Impacting functional limitations):  1. Decreased ADL/Functional Activities  2. Increased Pain  3. Decreased Activity Tolerance  4. Decreased Flexibility/Joint Mobility INTERVENTIONS PLANNED: (Treatment may consist of any combination of the following)  1. Cryotherapy  2. Heat  3. Home Exercise Program (HEP)  4. Manual Therapy  5. Neuromuscular Re-education/Strengthening  6. Range of Motion (ROM)  7. Therapeutic Activites  8. Therapeutic Exercise/Strengthening     GOALS: (Goals have been discussed and agreed upon with patient.)  Short-Term Functional Goals: Time Frame: by 6/8/20  1. Pt will report headache frequency of 3 a week or less. Achieved 6/17/20  2. Pt will report minimal to no pain with looking down to perform normal daily tasks (cooking, etc). Achieved 6/17/20   Discharge Goals: Time Frame: by 7/10/20   1. Pt will report no limitation in being active 5-7 days per week for 1-2 hours a day due to pain. Good progress as of 7/20/20  2. Pt will report no limitation in gardening or doing housework for 1 hour at a time due to pain. Good progress as of 7/20/20   3.  Pt will report headache frequency of 0-1 per week. Excellent progress as of 7/20/20  4. Pt will demonstrate improvement in function with NDI to 12% or less disability. Good progress as of 7/20/20     OUTCOME MEASURE:   Tool Used: Neck Disability Index (NDI)  Score:  Initial: 29/50 (58% disability)   6/17/20: 10/50 (20% disability)  7/20/20: 10/50 (20% disability)    Interpretation of Score: The Neck Disability Index is a revised form of the Oswestry Low Back Pain Index and is designed to measure the activities of daily living in person's with neck pain. Each section is scored on a 0-5 scale, 5 representing the greatest disability. The scores of each section are added together for a total score of 50. UPDATED OBJECTIVE FINDINGS:    Observation/Orthostatic Postural Assessment:          Mild forward head, rounded shoulders         Anterior deviation of humeral head during overhead flexion R>L   Palpation:          TTP with concordant pain at suboccipital muscles and upper cervical paraspinals; TTP in lower cervical paraspinals, right levator scapula, and upper trapezius. Decreased TTP compared to initial evaluation, but still present   ROM:      Flexion 60 degrees   Extension 50 degrees    SBL 50 degrees   SBR 50 degrees   Rot L 60 degrees-tightness local    Rot R  60 degrees-tightness local      Strength:          WNL UQS  Neurological Screen:        Dermatomes:  Intact, no tingling reported in right foot (general) and right hand 4-5th fingers         Reflexes:  Equal and intact patellar, achilles, brachioradialis, and biceps         Neural Tension Tests:  Increased tension at medial forearm to fingers with terminal elbow extension.     Functional Mobility:         Gait/Ambulation:  Normal         Transfers:  Independent            (SB=sidebending, Rot=rotation, TTP=tender to palpation, ULTTA=upper limb tension test-A, UQS=upper quarter scan, WNL=within normal limits; ROM measured in degrees)    MEDICAL NECESSITY:   · Patient is expected to demonstrate progress in soft tissue mobility to increase independence with high level activities such as racquetball and gardening. .  REASON FOR SERVICES/OTHER COMMENTS:  · Patient continues to require modification of therapeutic interventions to increase complexity of exercises. Total Duration:  PT Patient Time In/Time Out  Time In: 0730  Time Out: 0830    Rehabilitation Potential For Stated Goals: Good  Regarding Shannacharles Gerber's therapy, I certify that the treatment plan above will be carried out by a therapist or under their direction. Thank you for this referral,  Trevor Johnson, PT     Referring Physician Signature:  JESSICA Swann _______________________________ Date _____________

## 2020-07-23 ENCOUNTER — HOSPITAL ENCOUNTER (OUTPATIENT)
Dept: PHYSICAL THERAPY | Age: 35
Discharge: HOME | End: 2020-07-23
Payer: COMMERCIAL

## 2020-07-23 ENCOUNTER — APPOINTMENT (OUTPATIENT)
Dept: PHYSICAL THERAPY | Age: 35
End: 2020-07-23
Payer: COMMERCIAL

## 2020-07-23 PROCEDURE — 97110 THERAPEUTIC EXERCISES: CPT

## 2020-07-23 PROCEDURE — 97140 MANUAL THERAPY 1/> REGIONS: CPT

## 2020-07-23 NOTE — PROGRESS NOTES
Rebecca Saratoga  : 1985  Primary: [facility] Other  Secondary:  Therapy Center at [facility], [address] [facility]  [phone]   [phone]    OUTPATIENT PHYSICAL THERAPY: Daily Treatment Note 2020  Visit Count:  14    ICD-10: Treatment Diagnosis: Cervicalgia (M54.2), Headache (R51)  Precautions/Allergies:   Gluten and Mercury (elemental)   TREATMENT PLAN:  Effective Dates: 2020 TO 2020 (60 days). Frequency/Duration: 2 times a week for 60 Days    Pre-treatment Symptoms/Complaints:  Pt reports that she is not sure if it was the shrugs last visit or part of her workout, but that she has had increased upper trap spasm since last visit. Pain: Initial: Pain Intensity 1: 10 Post Session:  5/10   Medications Last Reviewed:  2020  Updated Objective Findings:  None today     TREATMENT:     Manual Therapy (    Soft Tissue Mobilization Duration  Duration: 40 Minutes): Manual techniques to facilitate improved motion and decreased pain. (Used abbreviations: MET - muscle energy technique; PNF - proprioceptive neuromuscular facilitation; NMR - neuromuscular re-education; a/p - anterior to posterior; p/a - posterior to anterior)   · STM to bilateral upper trapezius, levator scapulae, and cervical paraspinals  · STM to right thoracic paraspinals, latissimus dorsi (not performed)   · PA mobilizations to cervical and thoracic spine (grades II)  · Cervical traction  · Manual cervical ROM with contract relax techniques (flexion, sidebending)   · Upper cervical nodding mobilization: bilateral and each unilateral    · IASTM to right levator scapula   (not performed)    Therapeutic Exercise: (13 Minutes):  Exercises per grid below to improve mobility and coordination. Required moderate verbal cues to promote proper body mechanics. Progressed range, repetitions and complexity of movement as indicated.      2020   Activity/Exercise Parameters                 Patient education --   Cervical AROM  All directions 5 reps, 5 each side of 3 finger rotation    Thoracolumbar ROM  --   Scapular retractions  --   Chin tuck  --   Foam roll  --    Snag mobilization with towel  And first rib mobilization with belt 10 each    Supine shoulder protraction  --   Rows and punches  --   PNF total gym  --   Horizontal abduction  --        [employer] Portal  Treatment/Session Summary:    · Response to Treatment:  Pt with within session improvement in pain but overall reporting remitting/relapsing pain. Pt plans to visit chiropractor to see if adjustment will help . · Communication/Consultation:  None today   · Equipment provided today:  None today  · Recommendations/Intent for next treatment session: Next visit will focus on progression of pain control and mobility exercises.     Total Treatment Billable Duration:  53 minutes  PT Patient Time In/Time Out  Time In: 0730  Time Out: 0825  Margarita Hernandez, INDY    Future Appointments   Date Time Provider Juliocesar Herrera   7/27/2020  7:30 AM Aurora Matta [facility]   7/29/2020  2:30 PM Burke Junior, PT [facility]

## 2020-07-27 ENCOUNTER — HOSPITAL ENCOUNTER (OUTPATIENT)
Dept: PHYSICAL THERAPY | Age: 35
Discharge: HOME | End: 2020-07-27
Payer: COMMERCIAL

## 2020-07-27 PROCEDURE — 97140 MANUAL THERAPY 1/> REGIONS: CPT

## 2020-07-27 PROCEDURE — 97110 THERAPEUTIC EXERCISES: CPT

## 2020-07-27 NOTE — PROGRESS NOTES
Andra Ibanez  : 1985  Primary: [facility] Other  Secondary:  Therapy Center at [facility], [address] [facility]  [phone]   [phone]    OUTPATIENT PHYSICAL THERAPY: Daily Treatment Note 2020  Visit Count:  15    ICD-10: Treatment Diagnosis: Cervicalgia (M54.2), Headache (R51)  Precautions/Allergies:   Gluten and Mercury (elemental)   TREATMENT PLAN:  Effective Dates: 2020 TO 2020 (60 days). Frequency/Duration: 2 times a week for 60 Days    Pre-treatment Symptoms/Complaints:  Pt reports that she is doing a lot better than at her last few visits and that she thinks the manual work last visit helped. Pain: Initial: Pain Intensity 1: 1 /10 Post Session:  5/10   Medications Last Reviewed:  2020  Updated Objective Findings:  None today     TREATMENT:     Manual Therapy (    Soft Tissue Mobilization Duration  Duration: 25 Minutes): Manual techniques to facilitate improved motion and decreased pain. (Used abbreviations: MET - muscle energy technique; PNF - proprioceptive neuromuscular facilitation; NMR - neuromuscular re-education; a/p - anterior to posterior; p/a - posterior to anterior)   · STM to bilateral upper trapezius, levator scapulae, and cervical paraspinals  · PA mobilizations to cervical and thoracic spine (grades II)  · Cervical traction  · Manual cervical ROM with contract relax techniques (flexion, sidebending)   · Upper cervical nodding mobilization: bilateral and each unilateral    · IASTM to right levator scapula   (not performed)    Therapeutic Exercise: (28 Minutes):  Exercises per grid below to improve mobility and coordination. Required moderate verbal cues to promote proper body mechanics. Progressed range, repetitions and complexity of movement as indicated.      2020   Activity/Exercise Parameters                 Patient education --   Cervical AROM  All directions 5 reps, 5 each side of 3 finger rotation    Thoracolumbar ROM  Cat/camel, thoracic rotations 5 each    Scapular retractions  --   Chin tuck  --   Foam roll  --    Snag mobilization with towel  And first rib mobilization with belt 10 each    Supine shoulder protraction  --   Rows and punches  --   PNF total gym  --   Horizontal abduction  Blue band: 2 x 10 alternating with ER 2 x 10         [employer] Portal  Treatment/Session Summary:    · Response to Treatment:  Pt with good improvement in pain and headaches. Reviewed progression of first rib mobility while symptoms are improved. · Communication/Consultation:  None today   · Equipment provided today:  None today  · Recommendations/Intent for next treatment session: Next visit will focus on progression of pain control and mobility exercises.     Total Treatment Billable Duration:  53 minutes  PT Patient Time In/Time Out  Time In: 0730  Time Out: 0825  Nereida Caputo PT    Future Appointments   Date Time Provider Juliocesar Herrera   7/29/2020  2:30 PM Lawyer Kevin PT [facility]

## 2020-07-29 ENCOUNTER — HOSPITAL ENCOUNTER (OUTPATIENT)
Dept: PHYSICAL THERAPY | Age: 35
Discharge: HOME | End: 2020-07-29
Payer: COMMERCIAL

## 2020-07-29 NOTE — PROGRESS NOTES
Ariella Hwang   ([id number]) Therapy Center at  [address]  [phone]  [phone]           DATE: 7/29/2020    Patient canceled for appointment today due to unknown reason. Will plan to follow up on next scheduled visit.       Jarad Carrera, PT, DPT, OCS

## 2020-08-10 ENCOUNTER — HOSPITAL ENCOUNTER (OUTPATIENT)
Dept: PHYSICAL THERAPY | Age: 35
Discharge: HOME | End: 2020-08-10
Payer: COMMERCIAL

## 2020-08-10 PROCEDURE — 97140 MANUAL THERAPY 1/> REGIONS: CPT

## 2020-08-10 PROCEDURE — 97110 THERAPEUTIC EXERCISES: CPT

## 2020-08-10 NOTE — PROGRESS NOTES
Laya Small  : 1985  Primary: [facility] [facility] Other  Secondary:  [facility], [address] [facility]  [phone]   [phone]    OUTPATIENT PHYSICAL THERAPY: Daily Treatment Note 8/10/2020  Visit Count:  16    ICD-10: Treatment Diagnosis: Cervicalgia (M54.2), Headache (R51)  Precautions/Allergies:   Gluten and Mercury (elemental)   TREATMENT PLAN:  Effective Dates: 2020 TO 2020 (60 days). Frequency/Duration: 2 times a week for 60 Days    Pre-treatment Symptoms/Complaints:  Pt reports that she has been doing okay. SHe reports that she saw the chiropractor after her last visit and that he popped her neck and that she felt spasm in her neck immediately and it took a few days to recover. She reports that she is doing better now. Pain: Initial: Pain Intensity 1: 1 /10 Post Session:  1/10   Medications Last Reviewed:  8/10/2020  Updated Objective Findings:  None today     TREATMENT:     Manual Therapy (    Soft Tissue Mobilization Duration  Duration: 38 Minutes): Manual techniques to facilitate improved motion and decreased pain. (Used abbreviations: MET - muscle energy technique; PNF - proprioceptive neuromuscular facilitation; NMR - neuromuscular re-education; a/p - anterior to posterior; p/a - posterior to anterior)   · STM to bilateral upper trapezius, levator scapulae, and cervical paraspinals  · PA mobilizations to cervical and thoracic spine (grades II)  · Cervical traction  · Manual cervical ROM with contract relax techniques (flexion, sidebending)   · Upper cervical nodding mobilization: bilateral and each unilateral  (not performed)   · IASTM to right upper trapezius     Therapeutic Exercise: (15 Minutes):  Exercises per grid below to improve mobility and coordination. Required moderate verbal cues to promote proper body mechanics. Progressed range, repetitions and complexity of movement as indicated. 8/10/2020   Activity/Exercise Parameters                 Patient education --   Cervical AROM  All directions 5 reps, 5 each side of 3 finger rotation    Thoracolumbar ROM  Cat/camel, thoracic rotations 5 each    Scapular retractions  --   Chin tuck  --   Foam roll  --    Snag mobilization with towel  And first rib mobilization with belt 10 each    Supine shoulder protraction  --   Rows and punches  --   PNF total gym  --   Horizontal abduction  --        [employer] Portal  Treatment/Session Summary:    · Response to Treatment:  Pt continues to report intermittent cycle of symptoms but is demonstrating longer periods of time without symptoms. · Communication/Consultation:  None today   · Equipment provided today:  None today  · Recommendations/Intent for next treatment session: Next visit will focus on progression of pain control and mobility exercises.     Total Treatment Billable Duration:  53 minutes  PT Patient Time In/Time Out  Time In: 1030  Time Out: 36  Catie Cedeno PT    Future Appointments   Date Time Provider Juliocesar Herrera   8/17/2020  2:30 PM [facility] [place] [facility]   [facility]2020  [facility]   8/25/2020  1:30 PM INDY Barone SFJUDY MILLENNIUM   8/27/2020  7:30 AM Mary Junior PT SFOFF MILLENNIUM

## 2020-08-17 ENCOUNTER — HOSPITAL ENCOUNTER (OUTPATIENT)
Dept: PHYSICAL THERAPY | Age: 35
Discharge: HOME | End: 2020-08-17
Payer: COMMERCIAL

## 2020-08-17 PROCEDURE — 97140 MANUAL THERAPY 1/> REGIONS: CPT

## 2020-08-17 PROCEDURE — 97110 THERAPEUTIC EXERCISES: CPT

## 2020-08-17 NOTE — PROGRESS NOTES
Felipe Dears  : 1985  Primary: [facility] [employer] [facility] Other  Secondary:  [facility], [address]  [phone]   [phone]    OUTPATIENT PHYSICAL THERAPY: Daily Treatment Note 2020  Visit Count:  17    ICD-10: Treatment Diagnosis: Cervicalgia (M54.2), Headache (R51)  Precautions/Allergies:   Gluten and Mercury (elemental)   TREATMENT PLAN:  Effective Dates: 2020 TO 2020 (60 days). Frequency/Duration: 2 times a week for 60 Days    Pre-treatment Symptoms/Complaints:  Pt reports that she was sore when she left her last visit but that it was short lived. SHe reports that she was pain free until she did a GRIT class on Saturday and that made her neck feel tight for a few days but is better now. Pain: Initial: Pain Intensity 1: 0 /10 Post Session:  0/10   Medications Last Reviewed:  2020  Updated Objective Findings:  Improved soft tissue mobility in right upper trapezius and cervical paraspinals   TREATMENT:     Manual Therapy (    Soft Tissue Mobilization Duration  Duration: 30 Minutes): Manual techniques to facilitate improved motion and decreased pain. (Used abbreviations: MET - muscle energy technique; PNF - proprioceptive neuromuscular facilitation; NMR - neuromuscular re-education; a/p - anterior to posterior; p/a - posterior to anterior)   · STM to bilateral upper trapezius, levator scapulae, and cervical paraspinals  · PA mobilizations to cervical and thoracic spine (grades II)  · Cervical traction  · Manual cervical ROM with contract relax techniques (flexion, sidebending)   · Upper cervical nodding mobilization: bilateral and each unilateral  (not performed)   · IASTM to right upper trapezius (not performed)     Therapeutic Exercise: (25 Minutes):  Exercises per grid below to improve mobility and coordination. Required moderate verbal cues to promote proper body mechanics.   Progressed range, repetitions and complexity of movement as indicated. 8/17/2020   Activity/Exercise Parameters                 Patient education --   Cervical AROM  All directions 5 reps, 5 each side of 3 finger rotation    Thoracolumbar ROM  Cat/camel, thoracic rotations 5 each    Scapular retractions  2 x 10 I's and T's, 2 x 10 each side unilateral Y's---verbal and tactile cuing    Chin tuck  --   Foam roll  Cervical and thoracic mobility    Snag mobilization with towel  --   Supine shoulder protraction  --   Rows and punches  --   PNF total gym  --   Horizontal abduction  --        [employer] Portal  Treatment/Session Summary:    · Response to Treatment:  Pt with improved tolerance for exercise and decreased duration of symptoms following. · Communication/Consultation:  None today   · Equipment provided today:  None today  · Recommendations/Intent for next treatment session: Next visit will focus on progression of pain control and mobility exercises.     Total Treatment Billable Duration:  55 minutes  PT Patient Time In/Time Out  Time In: 1430  Time Out: 0  Catie Cedeno PT    Future Appointments   Date Time Provider Juliocesar Mcdowelli   8/21/2020  2:30 PM Aurora Hansen [facility]   8/28/2020  1:30 PM Mary Junior, INDY [facility]

## 2020-08-21 ENCOUNTER — HOSPITAL ENCOUNTER (OUTPATIENT)
Dept: PHYSICAL THERAPY | Age: 35
Discharge: HOME | End: 2020-08-21
Payer: COMMERCIAL

## 2020-08-21 PROCEDURE — 97140 MANUAL THERAPY 1/> REGIONS: CPT

## 2020-08-21 PROCEDURE — 97110 THERAPEUTIC EXERCISES: CPT

## 2020-08-21 NOTE — PROGRESS NOTES
Karol Dinh  : 1985  Primary: [employer] Other  Secondary:  [facility], [address] [facility]  [phone]   [phone]    OUTPATIENT PHYSICAL THERAPY: Daily Treatment Note 2020  Visit Count:  18    ICD-10: Treatment Diagnosis: Cervicalgia (M54.2), Headache (R51)  Precautions/Allergies:   Gluten and Mercury (elemental)   TREATMENT PLAN:  Effective Dates: 2020 TO 2020 (60 days). Frequency/Duration: 2 times a week for 60 Days    Pre-treatment Symptoms/Complaints:  Pt reports that she has felt really good all week and that she was able to work out twice with minimal pain in her neck and no headache. She reports that she is having some pain in her shoulders when she tries to do her \"swimming\" exercise though. Pain: Initial: Pain Intensity 1: 0 /10 Post Session:  0/10   Medications Last Reviewed:  2020  Updated Objective Findings:  Pain in subacromial space. Bilateral +Madhu Jack   TREATMENT:     Manual Therapy (    Soft Tissue Mobilization Duration  Duration: 15 Minutes): Manual techniques to facilitate improved motion and decreased pain. (Used abbreviations: MET - muscle energy technique; PNF - proprioceptive neuromuscular facilitation; NMR - neuromuscular re-education; a/p - anterior to posterior; p/a - posterior to anterior)   · STM to bilateral upper trapezius, levator scapulae, and cervical paraspinals  · PA mobilizations to cervical and thoracic spine (grades II)  · Cervical traction (not performed)   · Manual cervical ROM with contract relax techniques (flexion, sidebending)   · Upper cervical nodding mobilization: bilateral and each unilateral  (not performed)   · IASTM to right upper trapezius (not performed)     Therapeutic Exercise: (40 Minutes):  Exercises per grid below to improve mobility and coordination. Required moderate verbal cues to promote proper body mechanics.   Progressed range, repetitions and complexity of movement as indicated. 8/21/2020   Activity/Exercise Parameters                 Patient education Shoulder anatomy, avoidance of overhead exercises and IR/Flexion exercises for next week    Cervical AROM  All directions 5 reps, 5 each side of 3 finger rotation    Thoracolumbar ROM  Cat/camel, thoracic rotations 5 each    Scapular retractions  2 x 10 I's and T's, 2 x 10 each side unilateral Y's---verbal and tactile cuing    Chin tuck  10 regular, 2 x 10 with scapular protraction    Foam roll  Cervical and thoracic mobility    Snag mobilization with towel  --   Supine shoulder protraction  --   Rows and punches  --   PNF total gym  --   Horizontal abduction  And bilateral ER: 3 x 10 blue band         [employer] Portal  Treatment/Session Summary:    · Response to Treatment:  Pt with symptoms of subacromial impingement aggravated by both GRIT class and increased amount of GH flexion exercises in HEP. Pt advised to take a break from overhead positions but is other wise doing very well with symptom management. · Communication/Consultation:  None today   · Equipment provided today:  None today  · Recommendations/Intent for next treatment session: Next visit will focus on progression of pain control and mobility exercises.     Total Treatment Billable Duration:  55 minutes  PT Patient Time In/Time Out  Time In: 1430  Time Out: 0  Andrei Olivas PT    Future Appointments   Date Time Provider Juliocesar Herrera   8/28/2020  1:30 PM Jones Horowitz PT [facility]

## 2020-08-25 ENCOUNTER — APPOINTMENT (OUTPATIENT)
Dept: PHYSICAL THERAPY | Age: 35
End: 2020-08-25
Payer: COMMERCIAL

## 2020-08-28 ENCOUNTER — HOSPITAL ENCOUNTER (OUTPATIENT)
Dept: PHYSICAL THERAPY | Age: 35
Discharge: HOME | End: 2020-08-28
Payer: COMMERCIAL

## 2020-08-28 PROCEDURE — 97110 THERAPEUTIC EXERCISES: CPT

## 2020-08-28 PROCEDURE — 97140 MANUAL THERAPY 1/> REGIONS: CPT

## 2020-08-28 NOTE — PROGRESS NOTES
Palmer Briggs  : 1985  Primary: [facility] [employer] Other  Secondary:  [facility], [address] [facility]  [phone]   [phone]    OUTPATIENT PHYSICAL THERAPY: Daily Treatment Note 2020  Visit Count:  19    ICD-10: Treatment Diagnosis: Cervicalgia (M54.2), Headache (R51)  Precautions/Allergies:   Gluten and Mercury (elemental)   TREATMENT PLAN:  Effective Dates: 2020 TO 2020 (60 days). Frequency/Duration: 2 times a week for 60 Days    Pre-treatment Symptoms/Complaints:  Pt reports that she did really well until she went running. SHe reported some pain for 1/2 day after she ran once and some persistent pain from running 2 days ago. She reports that she also did GRIT which may have contributed to her pain. SHe reports that her shoulder pain has improved. Pain: Initial: Pain Intensity 1: 2 /10 Post Session:  0/10   Medications Last Reviewed:  2020  Updated Objective Findings:  None today    TREATMENT:     Manual Therapy (    Soft Tissue Mobilization Duration  Duration: 30 Minutes): Manual techniques to facilitate improved motion and decreased pain. (Used abbreviations: MET - muscle energy technique; PNF - proprioceptive neuromuscular facilitation; NMR - neuromuscular re-education; a/p - anterior to posterior; p/a - posterior to anterior)   · STM to bilateral upper trapezius, levator scapulae, and cervical paraspinals  · PA mobilizations to cervical and thoracic spine (grades II) (not performed)  · Cervical traction (not performed)   · Manual cervical ROM with contract relax techniques (flexion, sidebending)   · Upper cervical nodding mobilization: bilateral and each unilateral  (not performed)   · IASTM to right upper trapezius      Therapeutic Exercise: (25 Minutes):  Exercises per grid below to improve mobility and coordination. Required moderate verbal cues to promote proper body mechanics.   Progressed range, repetitions and complexity of movement as indicated. 8/28/2020   Activity/Exercise Parameters                 Patient education --   Cervical AROM  All directions 5 reps, 5 each side of 3 finger rotation    Thoracolumbar ROM  Cat/camel, thoracic rotations 5 each    Scapular retractions  2 x 10 I's to Y's    Chin tuck  10 regular, 2 x 10 with scapular protraction    Foam roll  Cervical and thoracic mobility    Snag mobilization with towel  --   Supine shoulder protraction  --   Rows and punches  --   PNF total gym  --   Horizontal abduction  And bilateral ER: 3 x 10 blue band         MedBridge Portal  Treatment/Session Summary:    · Response to Treatment:  Pt with resolution of DARIUS symptoms with avoidance of overhead activities since last visit. Pt reported mild headache and neck pain since running that improved with IASTM today. Despite reoccurance of symptoms, pt with decreased intensity and duration despite increasing activity level. · Communication/Consultation:  None today   · Equipment provided today:  None today  · Recommendations/Intent for next treatment session: Next visit will focus on progression of pain control and mobility exercises. Total Treatment Billable Duration:  55 minutes  PT Patient Time In/Time Out  Time In: 1330  Time Out: 100 [facility], PT    No future appointments.

## 2020-09-03 ENCOUNTER — APPOINTMENT (OUTPATIENT)
Dept: PHYSICAL THERAPY | Age: 35
End: 2020-09-03

## 2021-01-12 NOTE — THERAPY DISCHARGE
Trish De Los Santos  : 1985  Primary: [facility] Other  Secondary:  [facility] at [facility] 10, [address] [facility]  [phone]   [phone]       OUTPATIENT PHYSICAL THERAPY:Discontinuation Summary 2020   ICD-10: Treatment Diagnosis: Cervicalgia (M54.2), Headache (R51)  Precautions/Allergies:   Gluten and Mercury (elemental)   TREATMENT PLAN:  Effective Dates: 2020 TO 2020 (60 days). Frequency/Duration: 2 times a week for 60 Days     MEDICAL/REFERRING DIAGNOSIS:  Cervicalgia [M54.2]  Other chronic pain [G89.29]   DATE OF ONSET: mid-February following playing [employer]  REFERRING PHYSICIAN: JESSICA Swann MD Orders: evaluate and treat   Return MD Appointment: as needed      INITIAL ASSESSMENT:  Ms. Kevin Arnold is a 38year old female with neck pain, headache, and UE/LE tingling that began following playing [employer] in mid-February. She reports no injury of mechanism and that tingling has gone from bilateral to right side only and is intermittent and decreasing in frequency. She reports that neck pain and headache are still daily and very limiting to her activity level. She reports difficulty with any increased activity level such as racquetball, jogging, or exercising; holding her son, especially overhead; gardening; and looking down to do daily tasks such as cooking. She demonstrates muscular hypertonicity and tenderness upon palpation that are consistent with headache and neck pain symptoms and increased neural tension with ULTT-A that is consistent with her UE symptoms. She does not demonstrate any other s/s consistent with upper cervical instability/upper motor neuron symptoms or radiculopathy. She will benefit from skilled PT to address above listed impairments and functional limitations to facilitate return to prior level of function.    20: [place] has demonstrated good to excellent improvement in all therapy goals at this time. She continues to have occasional headaches and intermittent neck pain dependent on activity. She continues to improve, but has not fully returned to her active lifestyle and will continue to require PT to progress home program and facilitate return to prior level of function. 4/34/52: Since last recertification, patient was absent from therapy for a few weeks due to waiting for insurance authorization. She reported good tolerance and maintenance of symptoms during that time until the week before she restarted therapy, she reported re-aggravation of symptoms. Over the last 4 sessions, she has again improved significantly in pain intensity, headache frequency, and activity tolerance. She will continue to benefit from skilled PT to manage pain and transition to independent home program.   1/12/21: Myrtle Low has been seen in physical therapy from 5/11/20 to 8/28/20 for 19 visits. She experienced good improvement in symptoms initially and then had to return to therapy 7/20/20. Treatment has been discontinued at this time due to patient traveling for extended period and good management of symptoms independently. The below goals were met prior to discontinuation. Some goals were not met due to discontinuation of treatment. Thank you for this referral.        PROBLEM LIST (Impacting functional limitations):  1. Decreased ADL/Functional Activities  2. Increased Pain  3. Decreased Activity Tolerance  4. Decreased Flexibility/Joint Mobility INTERVENTIONS PLANNED: (Treatment may consist of any combination of the following)  1. Cryotherapy  2. Heat  3. Home Exercise Program (HEP)  4. Manual Therapy  5. Neuromuscular Re-education/Strengthening  6. Range of Motion (ROM)  7. Therapeutic Activites  8. Therapeutic Exercise/Strengthening     GOALS: (Goals have been discussed and agreed upon with patient.)  Short-Term Functional Goals: Time Frame: by 6/8/20  1.  Pt will report headache frequency of 3 a week or less. Achieved 6/17/20  2. Pt will report minimal to no pain with looking down to perform normal daily tasks (cooking, etc). Achieved 6/17/20   Discharge Goals: Time Frame: by 7/10/20   1. Pt will report no limitation in being active 5-7 days per week for 1-2 hours a day due to pain. Good progress at time of last visit  2. Pt will report no limitation in gardening or doing housework for 1 hour at a time due to pain. Good progress at time of last visit  3. Pt will report headache frequency of 0-1 per week. Excellent progress at time of last visit  4. Pt will demonstrate improvement in function with NDI to 12% or less disability. Good progress at time of last visit    OUTCOME MEASURE:   Tool Used: Neck Disability Index (NDI)  Score:  Initial: 29/50 (58% disability)   6/17/20: 10/50 (20% disability)  7/20/20: 10/50 (20% disability)    Interpretation of Score: The Neck Disability Index is a revised form of the Oswestry Low Back Pain Index and is designed to measure the activities of daily living in person's with neck pain. Each section is scored on a 0-5 scale, 5 representing the greatest disability. The scores of each section are added together for a total score of 50. UPDATED OBJECTIVE FINDINGS:    Observation/Orthostatic Postural Assessment:          Mild forward head, rounded shoulders         Anterior deviation of humeral head during overhead flexion R>L   Palpation:          TTP with concordant pain at suboccipital muscles and upper cervical paraspinals; TTP in lower cervical paraspinals, right levator scapula, and upper trapezius.  Decreased TTP compared to initial evaluation, but still present   ROM:      Flexion 60 degrees   Extension 50 degrees    SBL 50 degrees   SBR 50 degrees   Rot L 60 degrees-tightness local    Rot R  60 degrees-tightness local      Strength:          WNL UQS  Neurological Screen:        Dermatomes:  Intact, no tingling reported in right foot (general) and right hand 4-5th fingers         Reflexes:  Equal and intact patellar, achilles, brachioradialis, and biceps         Neural Tension Tests:  Increased tension at medial forearm to fingers with terminal elbow extension. Functional Mobility:         Gait/Ambulation:  Normal         Transfers:  Independent            (SB=sidebending, Rot=rotation, TTP=tender to palpation, ULTTA=upper limb tension test-A, UQS=upper quarter scan, WNL=within normal limits; ROM measured in degrees)  Rehabilitation Potential For Stated Goals: Good  Regarding Shanna Gerber's therapy, I certify that the treatment plan above will be carried out by a therapist or under their direction. Thank you for this referral,  Romy Ferris PT     Referring Physician Signature: JESSICA Mayes No Signature is Required for this note.

## 2021-04-08 PROBLEM — Z3A.18 18 WEEKS GESTATION OF PREGNANCY: Status: ACTIVE | Noted: 2021-04-08

## 2021-04-08 PROBLEM — Z86.59 HISTORY OF DEPRESSION: Status: ACTIVE | Noted: 2019-03-26

## 2021-04-08 PROBLEM — Z98.891 HISTORY OF CESAREAN DELIVERY: Status: ACTIVE | Noted: 2019-05-03

## 2021-04-08 PROBLEM — O09.521 AMA (ADVANCED MATERNAL AGE) MULTIGRAVIDA 35+, FIRST TRIMESTER: Status: ACTIVE | Noted: 2018-10-26

## 2021-04-08 PROBLEM — O09.519 HIGH-RISK PREGNANCY, PRIMIGRAVIDA OF ADVANCED MATERNAL AGE: Status: RESOLVED | Noted: 2018-10-26 | Resolved: 2021-04-08

## 2021-04-08 PROBLEM — O09.899 RUBELLA NON-IMMUNE STATUS, ANTEPARTUM: Status: RESOLVED | Noted: 2018-10-01 | Resolved: 2021-04-08

## 2021-04-08 PROBLEM — Z28.39 RUBELLA NON-IMMUNE STATUS, ANTEPARTUM: Status: RESOLVED | Noted: 2018-10-01 | Resolved: 2021-04-08

## 2021-04-12 PROBLEM — O34.219 H/O CESAREAN SECTION COMPLICATING PREGNANCY: Status: ACTIVE | Noted: 2019-05-03

## 2022-03-18 PROBLEM — O09.521 AMA (ADVANCED MATERNAL AGE) MULTIGRAVIDA 35+, FIRST TRIMESTER: Status: ACTIVE | Noted: 2018-10-26

## 2022-03-18 PROBLEM — O34.219 H/O CESAREAN SECTION COMPLICATING PREGNANCY: Status: ACTIVE | Noted: 2019-05-03

## 2022-03-19 PROBLEM — Z86.59 HISTORY OF DEPRESSION: Status: ACTIVE | Noted: 2019-03-26

## 2022-03-19 PROBLEM — Z3A.18 18 WEEKS GESTATION OF PREGNANCY: Status: ACTIVE | Noted: 2021-04-08

## 2022-03-19 PROBLEM — O34.80 POLYCYSTIC OVARY AFFECTING PREGNANCY, ANTEPARTUM: Status: ACTIVE | Noted: 2018-09-25

## 2022-03-19 PROBLEM — E28.2 POLYCYSTIC OVARY AFFECTING PREGNANCY, ANTEPARTUM: Status: ACTIVE | Noted: 2018-09-25

## 2024-12-16 ENCOUNTER — HOSPITAL ENCOUNTER (OUTPATIENT)
Dept: PHYSICAL THERAPY | Age: 39
Setting detail: RECURRING SERIES
Discharge: HOME | End: 2024-12-19
Payer: COMMERCIAL

## 2024-12-16 DIAGNOSIS — G89.29 CHRONIC PAIN OF BOTH SHOULDERS: Primary | ICD-10-CM

## 2024-12-16 DIAGNOSIS — M25.811 IMPINGEMENT OF BOTH SHOULDERS: ICD-10-CM

## 2024-12-16 DIAGNOSIS — M25.511 CHRONIC PAIN OF BOTH SHOULDERS: Primary | ICD-10-CM

## 2024-12-16 DIAGNOSIS — M62.81 MUSCLE WEAKNESS (GENERALIZED): ICD-10-CM

## 2024-12-16 DIAGNOSIS — M25.812 IMPINGEMENT OF BOTH SHOULDERS: ICD-10-CM

## 2024-12-16 DIAGNOSIS — M25.512 CHRONIC PAIN OF BOTH SHOULDERS: Primary | ICD-10-CM

## 2024-12-16 PROCEDURE — 97162 PT EVAL MOD COMPLEX 30 MIN: CPT

## 2024-12-16 NOTE — THERAPY EVALUATION
Bailey Kohler  : 1985  Primary: [employer] (Medicare Managed)  Secondary:  [facility] [place]  [place] [address]  Phone: [phone]  Fax: [phone] Plan Frequency: 2x a week      Plan of Care/Certification Expiration Date: 25        Plan of Care/Certification Expiration Date:  Plan of Care/Certification Expiration Date: 25    Frequency/Duration:   2x a week  Time In/Out:   Time In: 1030  Time Out: 1109      PT Visit Info:         Visit Count:  1                OUTPATIENT PHYSICAL THERAPY:             Initial Assessment 2024               Episode (PT B shoulder pain)         Treatment Diagnosis:    Chronic pain of both shoulders  Muscle weakness (generalized)  Impingement of both shoulders    Medical/Referring Diagnosis:    Impingement syndrome of both shoulders      Referring Physician:  Yvonne Reilly PA MD Orders:  PT Eval and Treat   Return MD Appt:  TBD  Date of Onset:    Chronic   Allergies:  Gluten and Mercury  Restrictions/Precautions:    None      Medications Last Reviewed:  2024     SUBJECTIVE   History of Injury/Illness (Reason for Referral):      Mrs Kohler \"Gael\" is a 39 year old female who underwent bilateral shoulder impingement surgery in [place] this year. She had her left surgery on 24 and right on 24. She reports no post surgical precautions. While in [place] she did have post op PT.  She recently moved back to town from [facility] and was trying to get into a fitness routine. She has increase B shoulder pain with lifting overhead and performing abduction motions. Left shoulder is more pain full with performing push ups and right shoulder more pain full at night when sleeping on that side.       Patient Stated Goal(s):  \"lift weights overhead, pain free push ups  and use weight on left arm\"  Initial Pain Level:     5  /10 left lateral shoulder  right lateral shoulder at night 6/10

## 2024-12-19 ENCOUNTER — APPOINTMENT (OUTPATIENT)
Dept: PHYSICAL THERAPY | Age: 39
End: 2024-12-19
Payer: COMMERCIAL

## 2025-01-09 ENCOUNTER — HOSPITAL ENCOUNTER (OUTPATIENT)
Dept: PHYSICAL THERAPY | Age: 40
Setting detail: RECURRING SERIES
Discharge: HOME | End: 2025-01-12
Payer: COMMERCIAL

## 2025-01-09 DIAGNOSIS — M25.512 CHRONIC PAIN OF BOTH SHOULDERS: Primary | ICD-10-CM

## 2025-01-09 DIAGNOSIS — G89.29 CHRONIC PAIN OF BOTH SHOULDERS: Primary | ICD-10-CM

## 2025-01-09 DIAGNOSIS — M25.511 CHRONIC PAIN OF BOTH SHOULDERS: Primary | ICD-10-CM

## 2025-01-09 DIAGNOSIS — M25.811 IMPINGEMENT OF BOTH SHOULDERS: ICD-10-CM

## 2025-01-09 DIAGNOSIS — M62.81 MUSCLE WEAKNESS (GENERALIZED): ICD-10-CM

## 2025-01-09 DIAGNOSIS — M25.812 IMPINGEMENT OF BOTH SHOULDERS: ICD-10-CM

## 2025-01-09 PROCEDURE — 97140 MANUAL THERAPY 1/> REGIONS: CPT

## 2025-01-09 PROCEDURE — 97110 THERAPEUTIC EXERCISES: CPT

## 2025-01-09 ASSESSMENT — PAIN SCALES - GENERAL: PAINLEVEL_OUTOF10: 5

## 2025-01-09 NOTE — PROGRESS NOTES
Bailey Kohler  : 1985  Primary: [employer] ([employer])  Secondary:  [facility] [place] [address] [place] [place] [address]  Phone: [phone]  Fax: [phone] Plan Frequency: 2x a week    Plan of Care/Certification Expiration Date: 25        Plan of Care/Certification Expiration Date:  Plan of Care/Certification Expiration Date: 25    Frequency/Duration:   Plan Frequency: 2x a week      Time In/Out:   Time In: 1030  Time Out: 1130      PT Visit Info:         Visit Count:  2    OUTPATIENT PHYSICAL THERAPY:   Treatment Note 2025       Episode  (PT B shoulder pain)               Treatment Diagnosis:    Chronic pain of both shoulders  Muscle weakness (generalized)  Impingement of both shoulders  Medical/Referring Diagnosis:    Impingement syndrome of both shoulders    Referring Physician:  Yvonne Reilly PA MD Orders:  PT Eval and Treat   Return MD Appt:  TBD   Date of Onset:  chronic  Allergies:   Gluten and Mercury  Restrictions/Precautions:   None      Interventions Planned (Treatment may consist of any combination of the following):     See Assessment Note    Subjective Comments:   Pt. Notes continued B shoulder pain  Initial Pain Level::     5/10  Post Session Pain Level:       5/10  Medications Last Reviewed: 2025  Updated Objective Findings:  None Today  Treatment   THERAPEUTIC EXERCISE: (30 minutes):    Exercises per grid below to improve mobility and strength.  Required minimal visual, verbal, and manual cues to promote proper body alignment, promote proper body posture, and promote proper body mechanics.  Progressed resistance, range, and repetitions as indicated.   Date:  2025   Activity/Exercise Parameters   Chest stretch 6 minutes   Latissimus stretch 5 minutes   Prone I's 3 x 10   Thoracic rotations 3 x 5   Quadruped thoracic rotation 2 x 5   Rows 3 x 10         Manual Therapy (23  Minutes): Manual techniques to facilitate

## 2025-01-13 ENCOUNTER — HOSPITAL ENCOUNTER (OUTPATIENT)
Dept: PHYSICAL THERAPY | Age: 40
Setting detail: RECURRING SERIES
Discharge: HOME | End: 2025-01-16
Payer: COMMERCIAL

## 2025-01-13 PROCEDURE — 97140 MANUAL THERAPY 1/> REGIONS: CPT

## 2025-01-13 PROCEDURE — 97110 THERAPEUTIC EXERCISES: CPT

## 2025-01-13 NOTE — PROGRESS NOTES
Bailey Kohler  : 1985  Primary: [employer] ([employer])  Secondary:  [facility]– [place][note 2[place] @ [place] [address] [place] [address]  Phone: [phone]  Fax: [phone] Plan Frequency: 2x a week    Plan of Care/Certification Expiration Date: 25        Plan of Care/Certification Expiration Date:  Plan of Care/Certification Expiration Date: 25    Frequency/Duration:   Plan Frequency: 2x a week      Time In/Out:   Time In: 1035  Time Out: 1130      PT Visit Info:         Visit Count:  3    OUTPATIENT PHYSICAL THERAPY:   Treatment Note 2025       Episode  (PT B shoulder pain)               Treatment Diagnosis:    Chronic pain of both shoulders  Muscle weakness (generalized)  Impingement of both shoulders  Medical/Referring Diagnosis:    Impingement syndrome of both shoulders    Referring Physician:  Yvonne Reilly PA MD Orders:  PT Eval and Treat   Return MD Appt:  TBD   Date of Onset:  chronic  Allergies:   Gluten and Mercury  Restrictions/Precautions:   None      Interventions Planned (Treatment may consist of any combination of the following):     See Assessment Note    Subjective Comments:   Some soreness following last visit. She is performing HEP as directed     Initial Pain Level::    6  10  Post Session Pain Level:        /10  Medications Last Reviewed: 2025  Updated Objective Findings:  None Today  Treatment   THERAPEUTIC EXERCISE: (30 minutes):    Exercises per grid below to improve mobility and strength.  Required minimal visual, verbal, and manual cues to promote proper body alignment, promote proper body posture, and promote proper body mechanics.  Progressed resistance, range, and repetitions as indicated.   Date:  2025   Activity/Exercise Parameters   Chest stretch 2X hold 30 sec doorway   Latissimus stretch 5 minutes   Prone I's 3 x 10   Thoracic rotations 3 x 5   Quadruped thoracic rotation    Rows 3 x 10 prone with scap

## 2025-01-16 ENCOUNTER — HOSPITAL ENCOUNTER (OUTPATIENT)
Dept: PHYSICAL THERAPY | Age: 40
Setting detail: RECURRING SERIES
Discharge: HOME | End: 2025-01-19
Payer: COMMERCIAL

## 2025-01-16 PROCEDURE — 97110 THERAPEUTIC EXERCISES: CPT

## 2025-01-16 PROCEDURE — 97140 MANUAL THERAPY 1/> REGIONS: CPT

## 2025-01-16 ASSESSMENT — PAIN SCALES - GENERAL: PAINLEVEL_OUTOF10: 5

## 2025-01-16 NOTE — PROGRESS NOTES
Bailey Kohler  : 1985  Primary: [employer] ([employer])  Secondary:  [facility] [place] @ [place] [address] [address] [address]  Phone: [phone]  Fax: [phone] Plan Frequency: 2x a week    Plan of Care/Certification Expiration Date: 25        Plan of Care/Certification Expiration Date:  Plan of Care/Certification Expiration Date: 25    Frequency/Duration:   Plan Frequency: 2x a week      Time In/Out:   Time In: 1030  Time Out: 1130      PT Visit Info:         Visit Count:  4    OUTPATIENT PHYSICAL THERAPY:   Treatment Note 2025       Episode  (PT B shoulder pain)               Treatment Diagnosis:    Chronic pain of both shoulders  Muscle weakness (generalized)  Impingement of both shoulders  Medical/Referring Diagnosis:    Impingement syndrome of both shoulders    Referring Physician:  Yvonne Reilly PA MD Orders:  PT Eval and Treat   Return MD Appt:  TBD   Date of Onset:  chronic  Allergies:   Gluten and Mercury  Restrictions/Precautions:   None      Interventions Planned (Treatment may consist of any combination of the following):     See Assessment Note    Subjective Comments:   Pt. Reports good compliance with HEP.     Initial Pain Level::     5/10  Post Session Pain Level:       5/10  Medications Last Reviewed: 2025  Updated Objective Findings:  None Today  Treatment   THERAPEUTIC EXERCISE: (30 minutes):    Exercises per grid below to improve mobility and strength.  Required minimal visual, verbal, and manual cues to promote proper body alignment, promote proper body posture, and promote proper body mechanics.  Progressed resistance, range, and repetitions as indicated.   Date:  2025   Activity/Exercise Parameters   Chest stretch 2X hold 30 sec doorway   Latissimus stretch 2 minutes   Prone I's 3 x 10   Thoracic rotations 3 x 5   Quadruped thoracic rotation 2 x 10   Rows 3 x 10 prone with scap depression   Seated upper trap

## 2025-01-20 ENCOUNTER — HOSPITAL ENCOUNTER (OUTPATIENT)
Dept: PHYSICAL THERAPY | Age: 40
Setting detail: RECURRING SERIES
Discharge: HOME | End: 2025-01-23
Payer: COMMERCIAL

## 2025-01-20 PROCEDURE — 97140 MANUAL THERAPY 1/> REGIONS: CPT

## 2025-01-20 PROCEDURE — 97110 THERAPEUTIC EXERCISES: CPT

## 2025-01-20 ASSESSMENT — PAIN SCALES - GENERAL: PAINLEVEL_OUTOF10: 5

## 2025-01-20 NOTE — PROGRESS NOTES
upper trap stretch  --     Seated levator scap stretch  --   Thread the needle 3 x 5   Sidelying shoulder ER (1#)    Bilateral ER  Yellow TB: 2 x 10    Flexion with band into abduction  Yellow band: 2 x 10 supine    Ball up wall AAROM flexion  With endrange stretch 10 reps      Manual Therapy (25  Minutes): Manual techniques to facilitate improved motion and decrease pain. (Used abbreviations; PNF - proprioceptive neuromuscular facilitation; a/p - anterior to posterior; p/a - posterior to anterior; cpa - central posterior anterior; upa -unilateral posterior anterior; SAL- superior anterior lateral glide; HVLAT - High Velocity Low Amplitude Thrust)  Soft tissue mobilization: B pectoralis, subscapularis, lateral scapular musculature, deltoid, biceps, upper trapezius  Joint mobilization: glenonhumeral grade III posterior inferior.  Joint mobilization: thoracic spine grade III CPA  Joint mobilization: lower cervical spine SAL grade III  Release technique B upper trap and levator scap    Treatment/Session Summary:    Treatment Assessment:   Pt. Reported discomfort with doorway stretch today, so mobility exercise was switched to ball up wall for thoracic and shoulder mobility.  She fatigued easily with rotator cuff strengthening today.        Communication/Consultation:  None today  Equipment provided today:  None  Recommendations/Intent for next treatment session: Next visit will focus on ROM, flexibility, mobility, and strengthening.    >Total Treatment Billable Duration:  55 minutes total time  Time In: 1201  Time Out: 1300    Corin Sandoval, PT         Charge Capture  Events  [employer] Portal  Appt Desk  Attendance Report     Future Appointments   Date Time Provider Department Center   1/23/2025 10:30 AM Lauro Liu, PT [facility]   1/28/2025 10:30 AM Lauro Liu, PT [facility]   1/30/2025 10:30 AM Lauro Liu, PT [facility]   2/19/2025 10:00 AM Corin Sandoval, PT [facility]   2/21/2025 10:30 AM Lauro iLu,

## 2025-01-23 ENCOUNTER — APPOINTMENT (OUTPATIENT)
Dept: PHYSICAL THERAPY | Age: 40
End: 2025-01-23
Payer: COMMERCIAL

## 2025-01-24 ENCOUNTER — HOSPITAL ENCOUNTER (OUTPATIENT)
Dept: PHYSICAL THERAPY | Age: 40
Setting detail: RECURRING SERIES
Discharge: HOME | End: 2025-01-27
Payer: COMMERCIAL

## 2025-01-24 PROCEDURE — 97140 MANUAL THERAPY 1/> REGIONS: CPT

## 2025-01-24 PROCEDURE — 97110 THERAPEUTIC EXERCISES: CPT

## 2025-01-24 ASSESSMENT — PAIN SCALES - GENERAL: PAINLEVEL_OUTOF10: 4

## 2025-01-24 NOTE — PROGRESS NOTES
20 reps green   Seated upper trap stretch  --     Seated levator scap stretch  --   Thread the needle    Sidelying shoulder ER (1#)    Bilateral ER  red TB: 2 x 10    Flexion with band into abduction     Ball up wall AAROM flexion  With endrange stretch 10 reps    Pull down Green x 20 reps   UT release with lacrosse ball 3 min each     Manual Therapy (25  Minutes): Manual techniques to facilitate improved motion and decrease pain. (Used abbreviations; PNF - proprioceptive neuromuscular facilitation; a/p - anterior to posterior; p/a - posterior to anterior; cpa - central posterior anterior; upa -unilateral posterior anterior; SAL- superior anterior lateral glide; HVLAT - High Velocity Low Amplitude Thrust)  Soft tissue mobilization: B pectoralis, subscapularis, lateral scapular musculature, deltoid, biceps, upper trapezius  Joint mobilization: glenonhumeral grade III posterior inferior.  Joint mobilization: thoracic spine grade III CPA  Joint mobilization: lower cervical spine SAL grade III  Release technique B upper trap and levator scap    Treatment/Session Summary:    Treatment Assessment:   Pt. Continues to c/o pain with end-range passive flexion.  Some difficulty decreasing UT recruitment with strengthening and worsens with reported fatigue.      Communication/Consultation:  None today  Equipment provided today:  None  Recommendations/Intent for next treatment session: Next visit will focus on ROM, flexibility, mobility, and strengthening.    >Total Treatment Billable Duration:  55 minutes total time  Time In: 0830  Time Out: 0930    Tiff Lucero PT         Charge Capture  Events  [employer] Portal  Appt Desk  Attendance Report     Future Appointments   Date Time Provider Department Center   1/28/2025 10:30 AM Lauro Liu PT [facility]   1/30/2025 10:30 AM Lauro Liu PT [facility]   2/19/2025 10:00 AM Corin Sandoval PT [facility]   2/21/2025 10:30 AM Lauro Liu PT [facility]   2/24/2025 11:00

## 2025-01-28 ENCOUNTER — HOSPITAL ENCOUNTER (OUTPATIENT)
Dept: PHYSICAL THERAPY | Age: 40
Setting detail: RECURRING SERIES
Discharge: HOME | End: 2025-01-31
Payer: COMMERCIAL

## 2025-01-28 PROCEDURE — 97140 MANUAL THERAPY 1/> REGIONS: CPT

## 2025-01-28 PROCEDURE — 97110 THERAPEUTIC EXERCISES: CPT

## 2025-01-28 ASSESSMENT — PAIN SCALES - GENERAL: PAINLEVEL_OUTOF10: 4

## 2025-01-28 NOTE — PROGRESS NOTES
Bailey Kohler  : 1985  Primary: [employer] ([employer])  Secondary:  [facility] @ [place]  [address] [place] [address]  Phone: [phone]  Fax: [phone] Plan Frequency: 2x a week    Plan of Care/Certification Expiration Date: 25        Plan of Care/Certification Expiration Date:  Plan of Care/Certification Expiration Date: 25    Frequency/Duration:   Plan Frequency: 2x a week      Time In/Out:   Time In: 1030  Time Out: 1130      PT Visit Info:    Progress Note Counter: 7      Visit Count:  7    OUTPATIENT PHYSICAL THERAPY:   Treatment Note 2025       Episode  (PT B shoulder pain)               Treatment Diagnosis:    Chronic pain of both shoulders  Muscle weakness (generalized)  Impingement of both shoulders  Medical/Referring Diagnosis:    Impingement syndrome of both shoulders    Referring Physician:  Yvonne Reilly PA MD Orders:  PT Eval and Treat   Return MD Appt:  TBD   Date of Onset:  chronic  Allergies:   Gluten and Mercury  Restrictions/Precautions:   None      Interventions Planned (Treatment may consist of any combination of the following):     See Assessment Note    Subjective Comments:   Pt notes most pain in the shoulders is with performing overhead exercises at the gym, especially with weights.     Initial Pain Level:     4/10  Post Session Pain Level:       4/10  Medications Last Reviewed: 2025  Updated Objective Findings:   B shoulder flexion 120 degrees today.   Treatment   THERAPEUTIC EXERCISE: (23 minutes):    Exercises per grid below to improve mobility and strength.  Required minimal visual, verbal, and manual cues to promote proper body alignment, promote proper body posture, and promote proper body mechanics.  Progressed resistance, range, and repetitions as indicated.   Date:  2025   Activity/Exercise Parameters   Chest stretch 4 minutes   Latissimus stretch 2 minutes   Prone I's 3 x 15   Thoracic

## 2025-01-30 ENCOUNTER — HOSPITAL ENCOUNTER (OUTPATIENT)
Dept: PHYSICAL THERAPY | Age: 40
Setting detail: RECURRING SERIES
End: 2025-01-30
Payer: COMMERCIAL

## 2025-01-30 PROCEDURE — 97140 MANUAL THERAPY 1/> REGIONS: CPT

## 2025-01-30 PROCEDURE — 97110 THERAPEUTIC EXERCISES: CPT

## 2025-01-30 ASSESSMENT — PAIN SCALES - GENERAL: PAINLEVEL_OUTOF10: 4

## 2025-01-30 NOTE — PROGRESS NOTES
Bailey Kohler  : 1985  Primary: [employer] ([employer])  Secondary:  [facility] @ [place]  [address] [place] [address]  Phone: [phone]  Fax: [phone] Plan Frequency: 2x a week    Plan of Care/Certification Expiration Date: 25        Plan of Care/Certification Expiration Date:  Plan of Care/Certification Expiration Date: 25    Frequency/Duration:   Plan Frequency: 2x a week      Time In/Out:   Time In: 1030  Time Out: 1130      PT Visit Info:    Progress Note Counter: 8      Visit Count:  8    OUTPATIENT PHYSICAL THERAPY:   Treatment Note 2025       Episode  (PT B shoulder pain)               Treatment Diagnosis:    Chronic pain of both shoulders  Muscle weakness (generalized)  Impingement of both shoulders  Medical/Referring Diagnosis:    Impingement syndrome of both shoulders    Referring Physician:  Yvonne Reilly PA MD Orders:  PT Eval and Treat   Return MD Appt:  TBD   Date of Onset:  chronic  Allergies:   Gluten and Mercury  Restrictions/Precautions:   None      Interventions Planned (Treatment may consist of any combination of the following):     See Assessment Note    Subjective Comments:   Pt reports pain with don and doffing sports bra.  The resistance lifting hurts in both shoulders.     Initial Pain Level:     4/10  Post Session Pain Level:       4/10  Medications Last Reviewed: 2025  Updated Objective Findings:  None Today  Treatment   THERAPEUTIC EXERCISE: (23 minutes):    Exercises per grid below to improve mobility and strength.  Required minimal visual, verbal, and manual cues to promote proper body alignment, promote proper body posture, and promote proper body mechanics.  Progressed resistance, range, and repetitions as indicated.   Date:  2025   Activity/Exercise Parameters   Chest stretch 4 minutes   Latissimus stretch 2 minutes   Prone I's (1#) 3 x 15   Thoracic rotations 3 x 5   Quadruped thoracic

## 2025-02-19 ENCOUNTER — HOSPITAL ENCOUNTER (OUTPATIENT)
Dept: PHYSICAL THERAPY | Age: 40
Setting detail: RECURRING SERIES
Discharge: HOME | End: 2025-02-22
Payer: COMMERCIAL

## 2025-02-19 PROCEDURE — 97110 THERAPEUTIC EXERCISES: CPT

## 2025-02-19 PROCEDURE — 97140 MANUAL THERAPY 1/> REGIONS: CPT

## 2025-02-19 ASSESSMENT — PAIN SCALES - GENERAL: PAINLEVEL_OUTOF10: 2

## 2025-02-19 NOTE — PROGRESS NOTES
Bailey Jose F  : 1985  Primary: [employer] ([employer])  Secondary:  [facility] @ [place]  [address] [place] [address]  Phone: [phone]  Fax: [phone] Plan Frequency: 2x a week    Plan of Care/Certification Expiration Date: 25        Plan of Care/Certification Expiration Date:  Plan of Care/Certification Expiration Date: 25    Frequency/Duration:   Plan Frequency: 2x a week      Time In/Out:   Time In: 1000  Time Out: 1100      PT Visit Info:    Progress Note Counter: 0      Visit Count:  9    OUTPATIENT PHYSICAL THERAPY:   Treatment Note 2025       Episode  (PT B shoulder pain)               Treatment Diagnosis:    Chronic pain of both shoulders  Muscle weakness (generalized)  Impingement of both shoulders  Medical/Referring Diagnosis:    Impingement syndrome of both shoulders    Referring Physician:  Yvonne Reilly PA MD Orders:  PT Eval and Treat   Return MD Appt:  TBD   Date of Onset:  chronic  Allergies:   Gluten and Mercury  Restrictions/Precautions:   None      Interventions Planned (Treatment may consist of any combination of the following):     See Assessment Note    Subjective Comments:   Pt reports that she did well while traveling and was able to put her carry on bag in the overhead bin.  She reports that her shoulders feel like they have really improved and that she has only had occasional pains with things like racquetball and during gym workouts.  She reports that she can tell that she is not yet as strong as normal.     Initial Pain Level:     2/10  Post Session Pain Level:       2/10  Medications Last Reviewed: 2025  Updated Objective Findings:  See Recertification Note from today  Treatment   THERAPEUTIC EXERCISE: (23 minutes):    Exercises per grid below to improve mobility and strength.  Required minimal visual, verbal, and manual cues to promote proper body alignment, promote proper body posture, and

## 2025-02-19 NOTE — THERAPY RECERTIFICATION
Bailey Kohler  : 1985  Primary: [employer] (Medicare Managed)  Secondary:  [facility] @ [place]  [place] [place] [address]  Phone: [phone]  Fax: [phone] Plan Frequency: 2x a week      Plan of Care/Certification Expiration Date: 25        Plan of Care/Certification Expiration Date:  Plan of Care/Certification Expiration Date: 25    Frequency/Duration:   2x a week  Time In/Out:   Time In: 1000  Time Out: 1100      PT Visit Info:    Progress Note Counter: 0      Visit Count:  9                OUTPATIENT PHYSICAL THERAPY:             Recertification 2025               Episode (PT B shoulder pain)         Treatment Diagnosis:    No data found  Chronic pain of both shoulders  Muscle weakness (generalized)  Impingement of both shoulders  Medical/Referring Diagnosis:    Impingement syndrome of both shoulders      Referring Physician:  Yvonne Reilly PA MD Orders:  PT Eval and Treat   Return MD Appt:  TBD  Date of Onset:    Chronic   Allergies:  Gluten and Mercury  Restrictions/Precautions:    None      Medications Last Reviewed:  2025     SUBJECTIVE   History of Injury/Illness (Reason for Referral):      Mrs Kohler \"Gael\" is a 39 year old female who underwent bilateral shoulder impingement surgery in [place] this year. She had her left surgery on 24 and right on 24. She reports no post surgical precautions. While in [place] she did have post op PT.  She recently moved back to town from [facility] and was trying to get into a fitness routine. She has increase B shoulder pain with lifting overhead and performing abduction motions. Left shoulder is more pain full with performing push ups and right shoulder more pain full at night when sleeping on that side.       Patient Stated Goal(s):  \"lift weights overhead, pain free push ups  and use weight on left arm\"  Initial Pain Level:     5  /10 left lateral shoulder  right

## 2025-02-21 ENCOUNTER — HOSPITAL ENCOUNTER (OUTPATIENT)
Dept: PHYSICAL THERAPY | Age: 40
Setting detail: RECURRING SERIES
Discharge: HOME | End: 2025-02-24
Payer: COMMERCIAL

## 2025-02-21 PROCEDURE — 97140 MANUAL THERAPY 1/> REGIONS: CPT

## 2025-02-21 PROCEDURE — 97110 THERAPEUTIC EXERCISES: CPT

## 2025-02-21 ASSESSMENT — PAIN SCALES - GENERAL: PAINLEVEL_OUTOF10: 2

## 2025-02-21 NOTE — PROGRESS NOTES
Continues to benefit from manual therapy and ROM exercises.       Communication/Consultation:  None today  Equipment provided today:  None  Recommendations/Intent for next treatment session: Next visit will focus on ROM, flexibility, mobility, and strengthening.    >Total Treatment Billable Duration:  53 minutes total time  Time In: 1030  Time Out: 1130    Lauro Liu, PT         Charge Capture  Events  [employer] Portal  Appt Desk  Attendance Report     Future Appointments   Date Time Provider Department Center   2/24/2025 11:00 AM Corin Sandoval, PT [facility]   2/26/2025 10:00 AM Corin Sandoval, PT [facility]

## 2025-02-24 ENCOUNTER — APPOINTMENT (OUTPATIENT)
Dept: PHYSICAL THERAPY | Age: 40
End: 2025-02-24
Payer: COMMERCIAL

## 2025-02-26 ENCOUNTER — HOSPITAL ENCOUNTER (OUTPATIENT)
Dept: PHYSICAL THERAPY | Age: 40
Setting detail: RECURRING SERIES
Discharge: HOME | End: 2025-03-01
Payer: COMMERCIAL

## 2025-02-26 PROCEDURE — 97140 MANUAL THERAPY 1/> REGIONS: CPT

## 2025-02-26 PROCEDURE — 97110 THERAPEUTIC EXERCISES: CPT

## 2025-02-26 ASSESSMENT — PAIN SCALES - GENERAL: PAINLEVEL_OUTOF10: 0

## 2025-02-26 NOTE — PROGRESS NOTES
(1#)    Bilateral ER  Red band: 2 x fatigue    Up, out and down 1 lb weights and red band 2 x 10    Pull down --   90 to endrange flexion supine  --    Horizontal abduction     PNF diagonals  Flexion patterns: 2.5 lbs: 2 x 10 each diagonal, each side      Manual Therapy (30  Minutes): Manual techniques to facilitate improved motion and decrease pain. (Used abbreviations; PNF - proprioceptive neuromuscular facilitation; a/p - anterior to posterior; p/a - posterior to anterior; cpa - central posterior anterior; upa -unilateral posterior anterior; SAL- superior anterior lateral glide; HVLAT - High Velocity Low Amplitude Thrust)  Soft tissue mobilization: B pectoralis, subscapularis, lateral scapular musculature, deltoid, biceps, upper trapezius  Joint mobilization: glenonhumeral grade III posterior inferior.  Joint mobilization: thoracic spine grade III CPA  Joint mobilization: lower cervical spine SAL grade III    FUNCTIONAL DRY NEEDLING: (0 minutes untimed):      With written consent received and precautions reviewed, instrument-assisted soft tissue mobilization was performed to:  Muscle(s): bilateral infraspinatus, teres major, teres minor, and upper trapezius   Needle(s) size used: .30 x 40mm  Technique(s): use of rotational/winding techniques  For the purpose of: trigger point release  and local twitch response that leads to alternation in length and tension of muscle fibers    The patient tolerated the treatment with a positive treatment effect and no complications noted. Clinical outcome of the treatment included needling treatment outcome: improved muscle tone and play.    Dry Needles Used: 0   Dry Needles Removed: 0     Patient has been educated with post-treatment care including hydration and mobility.     Treatment/Session Summary:    Treatment Assessment:   Pt continues to fatigue with exercises at or above 90 degrees flexion and reverts to substitution patterns at scapula.  Mobility is continuing to

## 2025-03-13 ENCOUNTER — HOSPITAL ENCOUNTER (OUTPATIENT)
Dept: PHYSICAL THERAPY | Age: 40
Setting detail: RECURRING SERIES
Discharge: HOME | End: 2025-03-16
Payer: COMMERCIAL

## 2025-03-13 PROCEDURE — 97110 THERAPEUTIC EXERCISES: CPT

## 2025-03-13 PROCEDURE — 97140 MANUAL THERAPY 1/> REGIONS: CPT

## 2025-03-13 ASSESSMENT — PAIN SCALES - GENERAL: PAINLEVEL_OUTOF10: 0

## 2025-03-13 NOTE — PROGRESS NOTES
Bailey Kohler  : 1985  Primary: [employer] ([employer])  Secondary:  [facility] @ [place]  [place] [place] [address]  Phone: [phone]  Fax: [phone] Plan Frequency: 2x a week    Plan of Care/Certification Expiration Date: 25        Plan of Care/Certification Expiration Date:  Plan of Care/Certification Expiration Date: 25    Frequency/Duration:   Plan Frequency: 2x a week      Time In/Out:   Time In: 1030  Time Out: 1130      PT Visit Info:    Progress Note Counter: 3      Visit Count:  12    OUTPATIENT PHYSICAL THERAPY:   Treatment Note 3/13/2025       Episode  (PT B shoulder pain)               Treatment Diagnosis:    Chronic pain of both shoulders  Muscle weakness (generalized)  Impingement of both shoulders  Medical/Referring Diagnosis:    Impingement syndrome of both shoulders    Referring Physician:  Yvonne Reilly PA MD Orders:  PT Eval and Treat   Return MD Appt:  TBD   Date of Onset:  chronic  Allergies:   Gluten and Mercury  Restrictions/Precautions:   None      Interventions Planned (Treatment may consist of any combination of the following):     See Assessment Note    Subjective Comments:   Pt reports good progress with exercise routine in the gym.  Pt. Reports continued weakness with overhead activities.       Initial Pain Level:     0/10  Post Session Pain Level:       0/10  Medications Last Reviewed: 3/13/2025  Updated Objective Findings:  None Today  Treatment   THERAPEUTIC EXERCISE: (23 minutes):    Exercises per grid below to improve mobility and strength.  Required minimal visual, verbal, and manual cues to promote proper body alignment, promote proper body posture, and promote proper body mechanics.  Progressed resistance, range, and repetitions as indicated.   Date:  3/13/2025   Activity/Exercise Parameters   Chest stretch 5 minutes   Latissimus stretch 5 minutes   Thoracic rotations 3 x 10   Thread the needle

## 2025-03-18 ENCOUNTER — HOSPITAL ENCOUNTER (OUTPATIENT)
Dept: PHYSICAL THERAPY | Age: 40
Setting detail: RECURRING SERIES
Discharge: HOME | End: 2025-03-21
Payer: COMMERCIAL

## 2025-03-18 PROCEDURE — 97140 MANUAL THERAPY 1/> REGIONS: CPT

## 2025-03-18 PROCEDURE — 97110 THERAPEUTIC EXERCISES: CPT

## 2025-03-18 ASSESSMENT — PAIN SCALES - GENERAL: PAINLEVEL_OUTOF10: 0

## 2025-03-18 NOTE — PROGRESS NOTES
headache symptoms.  B UE ROM continues to improve with ER, IR, and shoulder flexion.        Communication/Consultation:  None today  Equipment provided today:  None  Recommendations/Intent for next treatment session: Next visit will focus on ROM, flexibility, mobility, and strengthening.    >Total Treatment Billable Duration:  58 minutes total time  Time In: 1030  Time Out: 1130    Lauro Liu, PT         Charge Capture  Events  [employer] Portal  Appt Desk  Attendance Report     Future Appointments   Date Time Provider Department Center   3/26/2025  9:00 AM Corin Sandoval, PT [facility]   3/31/2025  9:30 AM Lauro Liu, PT [facility]   4/8/2025 10:30 AM Lauro Liu, PT [facility]   4/15/2025 10:30 AM Lauro Liu, PT [facility]

## 2025-03-25 NOTE — PROGRESS NOTES
Bailey Kohler  : 1985  Primary: [employer] ([employer])  Secondary:  [facility] [place] @ [place]  [place] [address]  Phone: [phone]  Fax: [phone] Plan Frequency: 2x a week    Plan of Care/Certification Expiration Date: 25        Plan of Care/Certification Expiration Date:  Plan of Care/Certification Expiration Date: 25    Frequency/Duration:   Plan Frequency: 2x a week      Time In/Out:   Time In: 0900  Time Out: 1000      PT Visit Info:    Progress Note Counter: 5      Visit Count:  14    OUTPATIENT PHYSICAL THERAPY:   Treatment Note 3/26/2025       Episode  (PT B shoulder pain)               Treatment Diagnosis:    Chronic pain of both shoulders  Muscle weakness (generalized)  Impingement of both shoulders  Medical/Referring Diagnosis:    Impingement syndrome of both shoulders    Referring Physician:  Yvonne Reilly PA MD Orders:  PT Eval and Treat   Return MD Appt:  TBD   Date of Onset:  chronic  Allergies:   Gluten and Mercury  Restrictions/Precautions:   None      Interventions Planned (Treatment may consist of any combination of the following):     See Assessment Note    Subjective Comments:   Pt reports that her headaches have improved since last visit but that she played volleyball and her right shoulder was pretty painful afterwards.       Initial Pain Level:     0/10  Post Session Pain Level:       0/10  Medications Last Reviewed: 3/26/2025  Updated Objective Findings:   improved mobility of levator scapula and upper trapezius  Treatment   THERAPEUTIC EXERCISE: (40 minutes):    Exercises per grid below to improve mobility and strength.  Required minimal visual, verbal, and manual cues to promote proper body alignment, promote proper body posture, and promote proper body mechanics.  Progressed resistance, range, and repetitions as indicated.   Date:  3/26/2025   Activity/Exercise Parameters   UBE  2 min fwd/bwd level 2.5

## 2025-03-26 ENCOUNTER — HOSPITAL ENCOUNTER (OUTPATIENT)
Dept: PHYSICAL THERAPY | Age: 40
Setting detail: RECURRING SERIES
Discharge: HOME | End: 2025-03-29
Payer: COMMERCIAL

## 2025-03-26 PROCEDURE — 97110 THERAPEUTIC EXERCISES: CPT

## 2025-03-26 PROCEDURE — 97140 MANUAL THERAPY 1/> REGIONS: CPT

## 2025-03-26 ASSESSMENT — PAIN SCALES - GENERAL: PAINLEVEL_OUTOF10: 0

## 2025-03-31 ENCOUNTER — HOSPITAL ENCOUNTER (OUTPATIENT)
Dept: PHYSICAL THERAPY | Age: 40
Setting detail: RECURRING SERIES
Discharge: HOME | End: 2025-04-03
Payer: COMMERCIAL

## 2025-03-31 PROCEDURE — 97140 MANUAL THERAPY 1/> REGIONS: CPT

## 2025-03-31 PROCEDURE — 97110 THERAPEUTIC EXERCISES: CPT

## 2025-03-31 ASSESSMENT — PAIN SCALES - GENERAL: PAINLEVEL_OUTOF10: 2

## 2025-03-31 NOTE — PROGRESS NOTES
Bailey Kohler  : 1985  Primary: [employer] ([employer])  Secondary:  [facility] @ [place]  [place] [address]  Phone: [phone]  Fax: [phone] Plan Frequency: 2x a week    Plan of Care/Certification Expiration Date: 25        Plan of Care/Certification Expiration Date:  Plan of Care/Certification Expiration Date: 25    Frequency/Duration:   Plan Frequency: 2x a week      Time In/Out:   Time In: 930  Time Out: 1030      PT Visit Info:    Progress Note Counter: 6      Visit Count:  15    OUTPATIENT PHYSICAL THERAPY:   Treatment Note 3/31/2025       Episode  (PT B shoulder pain)               Treatment Diagnosis:    Chronic pain of both shoulders  Muscle weakness (generalized)  Impingement of both shoulders  Medical/Referring Diagnosis:    Impingement syndrome of both shoulders    Referring Physician:  Yvonne Reilly PA MD Orders:  PT Eval and Treat   Return MD Appt:  TBD   Date of Onset:  chronic  Allergies:   Gluten and Mercury  Restrictions/Precautions:   None      Interventions Planned (Treatment may consist of any combination of the following):     See Assessment Note    Subjective Comments:   Pt notes mild B shoulder pain this week.  Pt. Notes she recovered well from recent flare up following volley ball play.        Initial Pain Level:     2/10  Post Session Pain Level:       2/10  Medications Last Reviewed: 3/31/2025  Updated Objective Findings:  None Today  Treatment   THERAPEUTIC EXERCISE: (23 minutes):    Exercises per grid below to improve mobility and strength.  Required minimal visual, verbal, and manual cues to promote proper body alignment, promote proper body posture, and promote proper body mechanics.  Progressed resistance, range, and repetitions as indicated.   Date:  3/31/2025   Activity/Exercise Parameters   UBE  --   Chest stretch 5 minutes   Latissimus stretch 2 minutes   Thoracic rotations 3 x 10   Bird

## 2025-04-08 ENCOUNTER — HOSPITAL ENCOUNTER (OUTPATIENT)
Dept: PHYSICAL THERAPY | Age: 40
Setting detail: RECURRING SERIES
Discharge: HOME | End: 2025-04-11
Payer: COMMERCIAL

## 2025-04-08 PROCEDURE — 97140 MANUAL THERAPY 1/> REGIONS: CPT

## 2025-04-08 PROCEDURE — 97110 THERAPEUTIC EXERCISES: CPT

## 2025-04-08 ASSESSMENT — PAIN SCALES - GENERAL: PAINLEVEL_OUTOF10: 2

## 2025-04-08 NOTE — PROGRESS NOTES
Bailey Kohler  : 1985  Primary: [employer] ([employer])  Secondary:  [facility] @ [place]  [address] [place] [address]  Phone: [phone]  Fax: [phone] Plan Frequency: 2x a week for 6 weeks    Plan of Care/Certification Expiration Date: 25        Plan of Care/Certification Expiration Date:  Plan of Care/Certification Expiration Date: 25    Frequency/Duration:   Plan Frequency: 2x a week for 6 weeks      Time In/Out:   Time In: 1030  Time Out: 1130      PT Visit Info:    Progress Note Counter: 7      Visit Count:  16    OUTPATIENT PHYSICAL THERAPY:   Treatment Note 2025       Episode  (PT B shoulder pain)               Treatment Diagnosis:    Chronic pain of both shoulders  Muscle weakness (generalized)  Impingement of both shoulders  Medical/Referring Diagnosis:    Impingement syndrome of both shoulders    Referring Physician:  Yvonne Reilly PA MD Orders:  PT Eval and Treat   Return MD Appt:  TBD   Date of Onset:  chronic  Allergies:   Gluten and Mercury  Restrictions/Precautions:   None      Interventions Planned (Treatment may consist of any combination of the following):     See Assessment Note    Subjective Comments:   Pt reports difficulty with plank into side plank trunk raise during exercise class.  Pt. Notes she is getting a little stronger with overhead exercises. .        Initial Pain Level:     2/10  Post Session Pain Level:       2/10  Medications Last Reviewed: 2025  Updated Objective Findings:  None Today  Treatment   THERAPEUTIC EXERCISE: (23 minutes):    Exercises per grid below to improve mobility and strength.  Required minimal visual, verbal, and manual cues to promote proper body alignment, promote proper body posture, and promote proper body mechanics.  Progressed resistance, range, and repetitions as indicated.   Date:  2025   Activity/Exercise Parameters   UBE  --   Chest stretch 5 minutes   Latissimus

## 2025-04-08 NOTE — THERAPY RECERTIFICATION
Bailey Kohler  : 1985  Primary: [employer] (Medicare Managed)  Secondary:  [facility] @ [place]  [place] [address]  Phone: [phone]  Fax: [phone] Plan Frequency: 2x a week for 6 weeks      Plan of Care/Certification Expiration Date: 25        Plan of Care/Certification Expiration Date:  Plan of Care/Certification Expiration Date: 25    Frequency/Duration:   2x a week for 6 weeks  Time In/Out:   Time In: 1030  Time Out: 1130      PT Visit Info:    Progress Note Counter: 7      Visit Count:  16                OUTPATIENT PHYSICAL THERAPY:             Recertification 2025               Episode (PT B shoulder pain)         Treatment Diagnosis:    No data found  Chronic pain of both shoulders  Muscle weakness (generalized)  Impingement of both shoulders  Medical/Referring Diagnosis:    Impingement syndrome of both shoulders      Referring Physician:  Yvonne Reilly PA MD Orders:  PT Eval and Treat   Return MD Appt:  TBD  Date of Onset:    Chronic   Allergies:  Gluten and Mercury  Restrictions/Precautions:    None      Medications Last Reviewed:  2025     SUBJECTIVE   History of Injury/Illness (Reason for Referral):      Mrs Kohler \"Gael\" is a 39 year old female who underwent bilateral [place] impingement surgery in [place] this [place]. She had her left surgery on 24 and right on 24. She reports no post surgical precautions. While in [place] she did have post op PT.  She recently moved back to town from [facility] and was trying to get into a fitness routine. She has increase B shoulder pain with lifting overhead and performing abduction motions. Left shoulder is more pain full with performing push ups and right shoulder more pain full at night when sleeping on that side.       Patient Stated Goal(s):  \"lift weights overhead, pain free push ups  and use weight on left arm\"  Initial Pain Level:     5 2/10 left

## 2025-04-15 ENCOUNTER — HOSPITAL ENCOUNTER (OUTPATIENT)
Dept: PHYSICAL THERAPY | Age: 40
Setting detail: RECURRING SERIES
Discharge: HOME | End: 2025-04-18
Payer: COMMERCIAL

## 2025-04-15 PROCEDURE — 97110 THERAPEUTIC EXERCISES: CPT

## 2025-04-15 PROCEDURE — 97140 MANUAL THERAPY 1/> REGIONS: CPT

## 2025-04-15 ASSESSMENT — PAIN SCALES - GENERAL: PAINLEVEL_OUTOF10: 3

## 2025-04-15 NOTE — PROGRESS NOTES
Bailey Kohler  : 1985  Primary: [employer] ([employer])  Secondary:  [facility] @ [place] [address] [place] [address]  Phone: [phone]  Fax: [phone] Plan Frequency: 2x a week for 6 weeks    Plan of Care/Certification Expiration Date: 25        Plan of Care/Certification Expiration Date:  Plan of Care/Certification Expiration Date: 25    Frequency/Duration:   Plan Frequency: 2x a week for 6 weeks      Time In/Out:   Time In: 1030  Time Out: 1130      PT Visit Info:    Progress Note Counter: 1      Visit Count:  17    OUTPATIENT PHYSICAL THERAPY:   Treatment Note 4/15/2025       Episode  (PT B shoulder pain)               Treatment Diagnosis:    Chronic pain of both shoulders  Muscle weakness (generalized)  Impingement of both shoulders  Medical/Referring Diagnosis:    Impingement syndrome of both shoulders    Referring Physician:  Yvonne Reilly PA MD Orders:  PT Eval and Treat   Return MD Appt:  TBD   Date of Onset:  chronic  Allergies:   Gluten and Mercury  Restrictions/Precautions:   None      Interventions Planned (Treatment may consist of any combination of the following):     See Assessment Note    Subjective Comments:   Pt. Notes moderate headache following last PT session that lasted for 3 days. Despite headache symptoms, pt. Notes she feels confident to continue HEP following todays treatment.         Initial Pain Level:     3/10  Post Session Pain Level:       3/10  Medications Last Reviewed: 4/15/2025  Updated Objective Findings:  See Discharge Note from today  Treatment   THERAPEUTIC EXERCISE: (23 minutes):    Exercises per grid below to improve mobility and strength.  Required minimal visual, verbal, and manual cues to promote proper body alignment, promote proper body posture, and promote proper body mechanics.  Progressed resistance, range, and repetitions as indicated.   Date:  4/15/2025   Activity/Exercise Parameters

## 2025-04-15 NOTE — THERAPY DISCHARGE
Bailey Kohler  : 1985  Primary: [employer] (Medicare Managed)  Secondary:  [facility] @ [place]  [place] [address]  Phone: [phone]  Fax: [phone] Plan Frequency: 2x a week for 6 weeks      Plan of Care/Certification Expiration Date: 25        Plan of Care/Certification Expiration Date:  Plan of Care/Certification Expiration Date: 25    Frequency/Duration:   2x a week for 6 weeks  Time In/Out:   Time In: 1030  Time Out: 1130      PT Visit Info:    Progress Note Counter: 1      Visit Count:  17                OUTPATIENT PHYSICAL THERAPY:             Discharge Summary 4/15/2025               Episode (PT B shoulder pain)         Treatment Diagnosis:    No data found  Chronic pain of both shoulders  Muscle weakness (generalized)  Impingement of both shoulders  Medical/Referring Diagnosis:    Impingement syndrome of both shoulders      Referring Physician:  Yvonne Reilly PA MD Orders:  PT Eval and Treat   Return MD Appt:  TBD  Date of Onset:    Chronic   Allergies:  Gluten and Mercury  Restrictions/Precautions:    None      Medications Last Reviewed:  4/15/2025     SUBJECTIVE   History of Injury/Illness (Reason for Referral):      Mrs Kohler \"Gael\" is a 39 year old female who underwent bilateral shoulder impingement surgery in [place] this [place]. She had her left surgery on 24 and right on 24. She reports no post surgical precautions. While in [place] she did have post op PT.  She recently moved back to town from [facility] and was trying to get into a fitness routine. She has increase B shoulder pain with lifting overhead and performing abduction motions. Left shoulder is more pain full with performing push ups and right shoulder more pain full at night when sleeping on that side.       Patient Stated Goal(s):  \"lift weights overhead, pain free push ups  and use weight on left arm\"  Initial Pain Level:     5 3/10